# Patient Record
Sex: FEMALE | Race: WHITE | NOT HISPANIC OR LATINO | Employment: OTHER | URBAN - METROPOLITAN AREA
[De-identification: names, ages, dates, MRNs, and addresses within clinical notes are randomized per-mention and may not be internally consistent; named-entity substitution may affect disease eponyms.]

---

## 2018-05-09 ENCOUNTER — HOSPITAL ENCOUNTER (OUTPATIENT)
Dept: LAB | Facility: MEDICAL CENTER | Age: 58
End: 2018-05-09
Attending: FAMILY MEDICINE
Payer: COMMERCIAL

## 2018-05-09 LAB
25(OH)D3 SERPL-MCNC: 43 NG/ML (ref 30–100)
ALBUMIN SERPL BCP-MCNC: 4.4 G/DL (ref 3.2–4.9)
ALBUMIN/GLOB SERPL: 1.6 G/DL
ALP SERPL-CCNC: 66 U/L (ref 30–99)
ALT SERPL-CCNC: 11 U/L (ref 2–50)
ANION GAP SERPL CALC-SCNC: 12 MMOL/L (ref 0–11.9)
AST SERPL-CCNC: 18 U/L (ref 12–45)
BASOPHILS # BLD AUTO: 1 % (ref 0–1.8)
BASOPHILS # BLD: 0.07 K/UL (ref 0–0.12)
BILIRUB SERPL-MCNC: 0.7 MG/DL (ref 0.1–1.5)
BUN SERPL-MCNC: 21 MG/DL (ref 8–22)
CALCIUM SERPL-MCNC: 9.4 MG/DL (ref 8.5–10.5)
CHLORIDE SERPL-SCNC: 102 MMOL/L (ref 96–112)
CHOLEST SERPL-MCNC: 293 MG/DL (ref 100–199)
CO2 SERPL-SCNC: 26 MMOL/L (ref 20–33)
COMMENT 1642: NORMAL
CREAT SERPL-MCNC: 0.7 MG/DL (ref 0.5–1.4)
EOSINOPHIL # BLD AUTO: 0.08 K/UL (ref 0–0.51)
EOSINOPHIL NFR BLD: 1.2 % (ref 0–6.9)
ERYTHROCYTE [DISTWIDTH] IN BLOOD BY AUTOMATED COUNT: 45.7 FL (ref 35.9–50)
GLOBULIN SER CALC-MCNC: 2.8 G/DL (ref 1.9–3.5)
GLUCOSE SERPL-MCNC: 91 MG/DL (ref 65–99)
HCT VFR BLD AUTO: 46 % (ref 37–47)
HDLC SERPL-MCNC: 57 MG/DL
HGB BLD-MCNC: 15 G/DL (ref 12–16)
IMM GRANULOCYTES # BLD AUTO: 0.01 K/UL (ref 0–0.11)
IMM GRANULOCYTES NFR BLD AUTO: 0.1 % (ref 0–0.9)
LDLC SERPL CALC-MCNC: 201 MG/DL
LYMPHOCYTES # BLD AUTO: 2.52 K/UL (ref 1–4.8)
LYMPHOCYTES NFR BLD: 37.1 % (ref 22–41)
MCH RBC QN AUTO: 31.9 PG (ref 27–33)
MCHC RBC AUTO-ENTMCNC: 32.6 G/DL (ref 33.6–35)
MCV RBC AUTO: 97.9 FL (ref 81.4–97.8)
MONOCYTES # BLD AUTO: 0.45 K/UL (ref 0–0.85)
MONOCYTES NFR BLD AUTO: 6.6 % (ref 0–13.4)
MORPHOLOGY BLD-IMP: NORMAL
NEUTROPHILS # BLD AUTO: 3.67 K/UL (ref 2–7.15)
NEUTROPHILS NFR BLD: 54 % (ref 44–72)
NRBC # BLD AUTO: 0 K/UL
NRBC BLD-RTO: 0 /100 WBC
PLATELET # BLD AUTO: 287 K/UL (ref 164–446)
PMV BLD AUTO: 11.9 FL (ref 9–12.9)
POTASSIUM SERPL-SCNC: 3.9 MMOL/L (ref 3.6–5.5)
PROT SERPL-MCNC: 7.2 G/DL (ref 6–8.2)
RBC # BLD AUTO: 4.7 M/UL (ref 4.2–5.4)
SODIUM SERPL-SCNC: 140 MMOL/L (ref 135–145)
T3FREE SERPL-MCNC: 3.47 PG/ML (ref 2.4–4.2)
T4 FREE SERPL-MCNC: 0.76 NG/DL (ref 0.53–1.43)
TRIGL SERPL-MCNC: 174 MG/DL (ref 0–149)
TSH SERPL DL<=0.005 MIU/L-ACNC: 2.73 UIU/ML (ref 0.38–5.33)
WBC # BLD AUTO: 6.8 K/UL (ref 4.8–10.8)

## 2018-05-09 PROCEDURE — 82306 VITAMIN D 25 HYDROXY: CPT

## 2018-05-09 PROCEDURE — 84481 FREE ASSAY (FT-3): CPT

## 2018-05-09 PROCEDURE — 83876 ASSAY MYELOPEROXIDASE: CPT

## 2018-05-09 PROCEDURE — 83735 ASSAY OF MAGNESIUM: CPT

## 2018-05-09 PROCEDURE — 83516 IMMUNOASSAY NONANTIBODY: CPT

## 2018-05-09 PROCEDURE — 86800 THYROGLOBULIN ANTIBODY: CPT

## 2018-05-09 PROCEDURE — 84443 ASSAY THYROID STIM HORMONE: CPT

## 2018-05-09 PROCEDURE — 80053 COMPREHEN METABOLIC PANEL: CPT

## 2018-05-09 PROCEDURE — 36415 COLL VENOUS BLD VENIPUNCTURE: CPT

## 2018-05-09 PROCEDURE — 369999 HCHG MISC LAB CHARGE

## 2018-05-09 PROCEDURE — 84439 ASSAY OF FREE THYROXINE: CPT

## 2018-05-09 PROCEDURE — 83698 ASSAY LIPOPROTEIN PLA2: CPT

## 2018-05-09 PROCEDURE — 85025 COMPLETE CBC W/AUTO DIFF WBC: CPT

## 2018-05-09 PROCEDURE — 80061 LIPID PANEL: CPT

## 2018-05-11 LAB — THYROGLOB AB SERPL-ACNC: <0.9 IU/ML (ref 0–4)

## 2018-05-12 LAB — MAGNESIUM RBC-SCNC: 2.4 MMOL/L (ref 1.5–3.1)

## 2018-05-18 LAB — TEST NAME 95000: NORMAL

## 2018-05-23 LAB — TEST NAME 95000: NORMAL

## 2018-05-30 LAB — MISCELLANEOUS LAB RESULT MISCLAB: NORMAL

## 2018-06-04 ENCOUNTER — HOSPITAL ENCOUNTER (OUTPATIENT)
Dept: RADIOLOGY | Facility: MEDICAL CENTER | Age: 58
End: 2018-06-04

## 2018-06-11 ENCOUNTER — HOSPITAL ENCOUNTER (OUTPATIENT)
Dept: RADIOLOGY | Facility: MEDICAL CENTER | Age: 58
End: 2018-06-11
Attending: FAMILY MEDICINE
Payer: COMMERCIAL

## 2018-06-11 DIAGNOSIS — R78.5 FINDING OF PSYCHOTROPIC DRUG IN BLOOD: ICD-10-CM

## 2018-06-11 DIAGNOSIS — Z12.39 SCREENING BREAST EXAMINATION: ICD-10-CM

## 2018-06-11 PROCEDURE — 77067 SCR MAMMO BI INCL CAD: CPT

## 2018-06-11 PROCEDURE — 4410556 CT-CARDIAC SCORING

## 2018-12-06 ENCOUNTER — HOSPITAL ENCOUNTER (OUTPATIENT)
Dept: LAB | Facility: MEDICAL CENTER | Age: 58
End: 2018-12-06
Attending: FAMILY MEDICINE
Payer: COMMERCIAL

## 2018-12-06 LAB
25(OH)D3 SERPL-MCNC: 37 NG/ML (ref 30–100)
ALBUMIN SERPL BCP-MCNC: 4.3 G/DL (ref 3.2–4.9)
ALBUMIN/GLOB SERPL: 1.5 G/DL
ALP SERPL-CCNC: 75 U/L (ref 30–99)
ALT SERPL-CCNC: 12 U/L (ref 2–50)
ANION GAP SERPL CALC-SCNC: 7 MMOL/L (ref 0–11.9)
AST SERPL-CCNC: 18 U/L (ref 12–45)
BILIRUB SERPL-MCNC: 0.7 MG/DL (ref 0.1–1.5)
BUN SERPL-MCNC: 26 MG/DL (ref 8–22)
CALCIUM SERPL-MCNC: 9.2 MG/DL (ref 8.5–10.5)
CHLORIDE SERPL-SCNC: 104 MMOL/L (ref 96–112)
CHOLEST SERPL-MCNC: 331 MG/DL (ref 100–199)
CO2 SERPL-SCNC: 28 MMOL/L (ref 20–33)
CREAT SERPL-MCNC: 0.95 MG/DL (ref 0.5–1.4)
CRP SERPL HS-MCNC: 1.3 MG/L (ref 0–7.5)
FASTING STATUS PATIENT QL REPORTED: NORMAL
GLOBULIN SER CALC-MCNC: 2.9 G/DL (ref 1.9–3.5)
GLUCOSE SERPL-MCNC: 91 MG/DL (ref 65–99)
HDLC SERPL-MCNC: 56 MG/DL
LDLC SERPL CALC-MCNC: 237 MG/DL
POTASSIUM SERPL-SCNC: 4.3 MMOL/L (ref 3.6–5.5)
PROT SERPL-MCNC: 7.2 G/DL (ref 6–8.2)
SODIUM SERPL-SCNC: 139 MMOL/L (ref 135–145)
TRIGL SERPL-MCNC: 190 MG/DL (ref 0–149)

## 2018-12-06 PROCEDURE — 80053 COMPREHEN METABOLIC PANEL: CPT

## 2018-12-06 PROCEDURE — 80061 LIPID PANEL: CPT

## 2018-12-06 PROCEDURE — 86141 C-REACTIVE PROTEIN HS: CPT

## 2018-12-06 PROCEDURE — 82306 VITAMIN D 25 HYDROXY: CPT

## 2018-12-06 PROCEDURE — 36415 COLL VENOUS BLD VENIPUNCTURE: CPT

## 2019-03-06 ENCOUNTER — HOSPITAL ENCOUNTER (OUTPATIENT)
Dept: LAB | Facility: MEDICAL CENTER | Age: 59
End: 2019-03-06
Attending: FAMILY MEDICINE
Payer: COMMERCIAL

## 2019-03-06 LAB
ALBUMIN SERPL BCP-MCNC: 4.3 G/DL (ref 3.2–4.9)
ALBUMIN/GLOB SERPL: 1.5 G/DL
ALP SERPL-CCNC: 69 U/L (ref 30–99)
ALT SERPL-CCNC: 14 U/L (ref 2–50)
ANION GAP SERPL CALC-SCNC: 8 MMOL/L (ref 0–11.9)
AST SERPL-CCNC: 17 U/L (ref 12–45)
BASOPHILS # BLD AUTO: 0.8 % (ref 0–1.8)
BASOPHILS # BLD: 0.06 K/UL (ref 0–0.12)
BILIRUB SERPL-MCNC: 0.5 MG/DL (ref 0.1–1.5)
BUN SERPL-MCNC: 30 MG/DL (ref 8–22)
CALCIUM SERPL-MCNC: 9.1 MG/DL (ref 8.5–10.5)
CHLORIDE SERPL-SCNC: 101 MMOL/L (ref 96–112)
CHOLEST SERPL-MCNC: 308 MG/DL (ref 100–199)
CO2 SERPL-SCNC: 29 MMOL/L (ref 20–33)
CREAT SERPL-MCNC: 0.79 MG/DL (ref 0.5–1.4)
EOSINOPHIL # BLD AUTO: 0.1 K/UL (ref 0–0.51)
EOSINOPHIL NFR BLD: 1.4 % (ref 0–6.9)
ERYTHROCYTE [DISTWIDTH] IN BLOOD BY AUTOMATED COUNT: 45.2 FL (ref 35.9–50)
EST. AVERAGE GLUCOSE BLD GHB EST-MCNC: 120 MG/DL
FASTING STATUS PATIENT QL REPORTED: NORMAL
GLOBULIN SER CALC-MCNC: 2.8 G/DL (ref 1.9–3.5)
GLUCOSE SERPL-MCNC: 89 MG/DL (ref 65–99)
HBA1C MFR BLD: 5.8 % (ref 0–5.6)
HCT VFR BLD AUTO: 45.1 % (ref 37–47)
HDLC SERPL-MCNC: 52 MG/DL
HGB BLD-MCNC: 14.3 G/DL (ref 12–16)
IMM GRANULOCYTES # BLD AUTO: 0.04 K/UL (ref 0–0.11)
IMM GRANULOCYTES NFR BLD AUTO: 0.5 % (ref 0–0.9)
LDLC SERPL CALC-MCNC: ABNORMAL MG/DL
LYMPHOCYTES # BLD AUTO: 3.22 K/UL (ref 1–4.8)
LYMPHOCYTES NFR BLD: 43.7 % (ref 22–41)
MCH RBC QN AUTO: 31.6 PG (ref 27–33)
MCHC RBC AUTO-ENTMCNC: 31.7 G/DL (ref 33.6–35)
MCV RBC AUTO: 99.8 FL (ref 81.4–97.8)
MONOCYTES # BLD AUTO: 0.51 K/UL (ref 0–0.85)
MONOCYTES NFR BLD AUTO: 6.9 % (ref 0–13.4)
NEUTROPHILS # BLD AUTO: 3.44 K/UL (ref 2–7.15)
NEUTROPHILS NFR BLD: 46.7 % (ref 44–72)
NRBC # BLD AUTO: 0 K/UL
NRBC BLD-RTO: 0 /100 WBC
PLATELET # BLD AUTO: 303 K/UL (ref 164–446)
PMV BLD AUTO: 11.2 FL (ref 9–12.9)
POTASSIUM SERPL-SCNC: 4 MMOL/L (ref 3.6–5.5)
PROT SERPL-MCNC: 7.1 G/DL (ref 6–8.2)
RBC # BLD AUTO: 4.52 M/UL (ref 4.2–5.4)
SODIUM SERPL-SCNC: 138 MMOL/L (ref 135–145)
T3FREE SERPL-MCNC: 3.63 PG/ML (ref 2.4–4.2)
T4 FREE SERPL-MCNC: 0.89 NG/DL (ref 0.53–1.43)
TRIGL SERPL-MCNC: 406 MG/DL (ref 0–149)
TSH SERPL DL<=0.005 MIU/L-ACNC: 2.6 UIU/ML (ref 0.38–5.33)
WBC # BLD AUTO: 7.4 K/UL (ref 4.8–10.8)

## 2019-03-06 PROCEDURE — 80053 COMPREHEN METABOLIC PANEL: CPT

## 2019-03-06 PROCEDURE — 84439 ASSAY OF FREE THYROXINE: CPT

## 2019-03-06 PROCEDURE — 84443 ASSAY THYROID STIM HORMONE: CPT

## 2019-03-06 PROCEDURE — 80061 LIPID PANEL: CPT

## 2019-03-06 PROCEDURE — 36415 COLL VENOUS BLD VENIPUNCTURE: CPT

## 2019-03-06 PROCEDURE — 83036 HEMOGLOBIN GLYCOSYLATED A1C: CPT

## 2019-03-06 PROCEDURE — 85025 COMPLETE CBC W/AUTO DIFF WBC: CPT

## 2019-03-06 PROCEDURE — 84481 FREE ASSAY (FT-3): CPT

## 2019-03-06 PROCEDURE — 86800 THYROGLOBULIN ANTIBODY: CPT

## 2019-03-08 LAB — THYROGLOB AB SERPL-ACNC: <0.9 IU/ML (ref 0–4)

## 2019-07-23 ENCOUNTER — OFFICE VISIT (OUTPATIENT)
Dept: CARDIOLOGY | Facility: MEDICAL CENTER | Age: 59
End: 2019-07-23
Payer: COMMERCIAL

## 2019-07-23 VITALS
DIASTOLIC BLOOD PRESSURE: 80 MMHG | OXYGEN SATURATION: 96 % | WEIGHT: 158 LBS | SYSTOLIC BLOOD PRESSURE: 120 MMHG | BODY MASS INDEX: 24.8 KG/M2 | HEART RATE: 88 BPM | HEIGHT: 67 IN

## 2019-07-23 DIAGNOSIS — E78.49 FAMILIAL HYPERLIPIDEMIA, HIGH LDL: ICD-10-CM

## 2019-07-23 PROCEDURE — 99204 OFFICE O/P NEW MOD 45 MIN: CPT | Performed by: INTERNAL MEDICINE

## 2019-07-23 RX ORDER — ZOLPIDEM TARTRATE 12.5 MG/1
TABLET, FILM COATED, EXTENDED RELEASE ORAL
Refills: 0 | COMMUNITY
Start: 2019-07-03

## 2019-07-23 RX ORDER — ROSUVASTATIN CALCIUM 5 MG/1
5 TABLET, COATED ORAL EVERY EVENING
Qty: 45 TAB | Refills: 0 | Status: SHIPPED | OUTPATIENT
Start: 2019-07-23 | End: 2019-09-06 | Stop reason: SDUPTHER

## 2019-07-23 NOTE — PATIENT INSTRUCTIONS
-PCSK9-Inhibitors, injected in the thigh every 2 weeks, drops the LDL well below 100, not sure of the vascular benefit, ie, not clear how much is protects from heart attack and stroke.  -Statin therapy, they are all different.  There is a benefit to being on any dose, even if one time per week at the lowest dose. I would use Crestor 5 mg once time per week.  -Goal LDL is under 100.  -Call if you have any side effects, questions or concerns: [phone]

## 2019-07-23 NOTE — PROGRESS NOTES
"Subjective:   Chief Complaint:   Chief Complaint   Patient presents with   • Hyperlipidemia       Mari \"Aranza\" Torie Sahni is a 58 y.o. female who is referred by Dr. Ennis for HLP.  Has had this since her 20s. Runs in the family.    Lowest TC she ever had was 200.    Has had myalgias, lipitor and a second one.    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    She is not limited by chest pain, pressure or tightness.   No significant dyspnea on exertion, orthopnea or lower extremity swelling.   No significant palpitations, dizziness, or presyncope/syncope.   No symptoms of leg claudication.   No stroke/TIA like symptoms.    No DM, tobacco, HTN, CKD, SLE/RA.    Mother had angioplasty in her 40s, no MI. Has HLP, DM, HTN.  Father has HLP, not sure how high.      DATA REVIEWED by me:    CT score 18  Coronary calcification:  LMA - 0.0  LCX - 0.0  LAD - 0.0  RCA - 0.0  PDA - 0.0    Calcium score:  0.0      Most recent labs:     3-6-19 hgb 14.3, plt 303, na 138, k 4, cr 0.79, LFTs normal, hgb A1C 5.8, , , HDL 52, Cannot detect LDL, TSH 2.6    18 , , HDL 56,     History reviewed. No pertinent past medical history.  Past Surgical History:   Procedure Laterality Date   • OTHER ORTHOPEDIC SURGERY      back pain x 6 months   • PB ENLARGE BREAST WITH IMPLANT       Family History   Problem Relation Age of Onset   • Heart Disease Mother         Angioplasty 40s   • Hyperlipidemia Mother    • Diabetes Mother    • Hypertension Mother    • Other Father         Alive at 82, has Parkinsons   • Hyperlipidemia Father    • Hypertension Sister    • Hyperlipidemia Sister    • Heart Attack Paternal Grandfather          of MI in 60s, CABG, had PM     Social History     Social History   • Marital status:      Spouse name: N/A   • Number of children: N/A   • Years of education: N/A     Occupational History   • Not on file.     Social History Main Topics   • Smoking status: Never " "Smoker   • Smokeless tobacco: Never Used   • Alcohol use No   • Drug use: Unknown   • Sexual activity: Not on file     Other Topics Concern   • Not on file     Social History Narrative   • No narrative on file     Allergies   Allergen Reactions   • Codeine    • Pcn [Penicillins]        Current Outpatient Prescriptions   Medication Sig Dispense Refill   • zolpidem (AMBIEN CR) 12.5 MG CR tablet TAKE 1 TABLET ORALLY 1 HOUR BEFORE BEDTIME AS NEEDED FOR INSOMNIA 7-3-19 G47.01  0   • rosuvastatin (CRESTOR) 5 MG Tab Take 1 Tab by mouth every evening. 45 Tab 0   • DULoxetine HCl (CYMBALTA PO) Take 120 mg by mouth every day.       No current facility-administered medications for this visit.        ROS  All others systems reviewed and negative.     Objective:     /80 (BP Location: Left arm, Patient Position: Sitting, BP Cuff Size: Adult)   Pulse 88   Ht 1.702 m (5' 7\")   Wt 71.7 kg (158 lb)   SpO2 96%  Body mass index is 24.75 kg/m².    Physical Exam   General: No acute distress. Well nourished.  HEENT: EOM grossly intact, no scleral icterus, no pharyngeal erythema.   Neck:  No JVD, no bruits, trachea midline  CVS: RRR. Normal S1, S2. No M/R/G. No LE edema.  2+ radial pulses, 2+ DP pulses  Resp: CTAB. No wheezing or crackles/rhonchi. Normal respiratory effort.  Abdomen: Soft, NT, no lisa hepatomegaly.  MSK/Ext: No clubbing or cyanosis.  Skin: Warm and dry, no rashes.  Neurological: CN III-XII grossly intact. No focal deficits.   Psych: A&O x 3, appropriate affect, good judgement      Assessment:     1. Familial hyperlipidemia, high LDL  REFERRAL TO LIPID CLINIC       Medical Decision Making:  Today's Assessment / Status / Plan:     -LDL is greater than 190, familial HLP, statin or PCSK9-I  -She will try low dose crestor 5 mg one time weekly  -Referral to lipid clinic for PCSK9-I therapy, education and navigating cost.  -It is good that she has no calcium but she could still have soft/dark plaque CAD.  Also statin " therapy can cause calcification/worsening calcium score.    Written instructions given today:    -PCSK9-Inhibitors, injected in the thigh every 2 weeks, drops the LDL well below 100, not sure of the vascular benefit, ie, not clear how much is protects from heart attack and stroke.  -Statin therapy, they are all different.  There is a benefit to being on any dose, even if one time per week at the lowest dose. I would use Crestor 5 mg once time per week.  -Goal LDL is under 100.  -Call if you have any side effects, questions or concerns: [phone]        Return in about 1 year (around 7/23/2020).    It is my pleasure to participate in the care of Ms. Sahni.  Please do not hesitate to contact me with questions or concerns.    Mari Musa MD, [facility]  Cardiologist [facility]    Please note that this dictation was created using voice recognition software. I have made every reasonable attempt to correct obvious errors, but it is possible there are errors of grammar and possibly content that I did not discover before finalizing the note.

## 2019-07-23 NOTE — LETTER
"     [facility]   [address]   [address]  [place] [address]  Phone: [phone]  Fax: [phone]              Mari Sahni  1960    Encounter Date: 7/23/2019    Mari Musa M.D.          PROGRESS NOTE:  Subjective:   Chief Complaint:   Chief Complaint   Patient presents with   • Hyperlipidemia       Mari \"Aranza\" Torie Sahni is a 58 y.o. female who is referred by Dr. Ennis for HLP.  Has had this since her 20s. Runs in the family.    Lowest TC she ever had was 200.    Has had myalgias, lipitor and a second one.    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    She is not limited by chest pain, pressure or tightness.   No significant dyspnea on exertion, orthopnea or lower extremity swelling.   No significant palpitations, dizziness, or presyncope/syncope.   No symptoms of leg claudication.   No stroke/TIA like symptoms.    No DM, tobacco, HTN, CKD, SLE/RA.    Mother had angioplasty in her 40s, no MI. Has HLP, DM, HTN.  Father has HLP, not sure how high.      DATA REVIEWED by me:    CT score 6-11-18  Coronary calcification:  LMA - 0.0  LCX - 0.0  LAD - 0.0  RCA - 0.0  PDA - 0.0    Calcium score:  0.0      Most recent labs:     3-6-19 hgb 14.3, plt 303, na 138, k 4, cr 0.79, LFTs normal, hgb A1C 5.8, , , HDL 52, Cannot detect LDL, TSH 2.6    12-6-18 , , HDL 56,     History reviewed. No pertinent past medical history.  Past Surgical History:   Procedure Laterality Date   • OTHER ORTHOPEDIC SURGERY      back pain x 6 months   • PB ENLARGE BREAST WITH IMPLANT       Family History   Problem Relation Age of Onset   • Heart Disease Mother         Angioplasty 40s   • Hyperlipidemia Mother    • Diabetes Mother    • Hypertension Mother    • Other Father         Alive at 82, has Parkinsons   • Hyperlipidemia Father    • Hypertension Sister    • Hyperlipidemia Sister    • Heart Attack Paternal " "Grandfather          of MI in 60s, CABG, had PM     Social History     Social History   • Marital status:      Spouse name: N/A   • Number of children: N/A   • Years of education: N/A     Occupational History   • Not on file.     Social History Main Topics   • Smoking status: Never Smoker   • Smokeless tobacco: Never Used   • Alcohol use No   • Drug use: Unknown   • Sexual activity: Not on file     Other Topics Concern   • Not on file     Social History Narrative   • No narrative on file     Allergies   Allergen Reactions   • Codeine    • Pcn [Penicillins]        Current Outpatient Prescriptions   Medication Sig Dispense Refill   • zolpidem (AMBIEN CR) 12.5 MG CR tablet TAKE 1 TABLET ORALLY 1 HOUR BEFORE BEDTIME AS NEEDED FOR INSOMNIA 7-3-19 G47.01  0   • rosuvastatin (CRESTOR) 5 MG Tab Take 1 Tab by mouth every evening. 45 Tab 0   • DULoxetine HCl (CYMBALTA PO) Take 120 mg by mouth every day.       No current facility-administered medications for this visit.        ROS  All others systems reviewed and negative.     Objective:     /80 (BP Location: Left arm, Patient Position: Sitting, BP Cuff Size: Adult)   Pulse 88   Ht 1.702 m (5' 7\")   Wt 71.7 kg (158 lb)   SpO2 96%  Body mass index is 24.75 kg/m².    Physical Exam   General: No acute distress. Well nourished.  HEENT: EOM grossly intact, no scleral icterus, no pharyngeal erythema.   Neck:  No JVD, no bruits, trachea midline  CVS: RRR. Normal S1, S2. No M/R/G. No LE edema.  2+ radial pulses, 2+ DP pulses  Resp: CTAB. No wheezing or crackles/rhonchi. Normal respiratory effort.  Abdomen: Soft, NT, no lisa hepatomegaly.  MSK/Ext: No clubbing or cyanosis.  Skin: Warm and dry, no rashes.  Neurological: CN III-XII grossly intact. No focal deficits.   Psych: A&O x 3, appropriate affect, good judgement      Assessment:     1. Familial hyperlipidemia, high LDL  REFERRAL TO LIPID CLINIC       Medical Decision Making:  Today's Assessment / Status / " Plan:     -LDL is greater than 190, familial HLP, statin or PCSK9-I  -She will try low dose crestor 5 mg one time weekly  -Referral to lipid clinic for PCSK9-I therapy, education and navigating cost.  -It is good that she has no calcium but she could still have soft/dark plaque CAD.  Also statin therapy can cause calcification/worsening calcium score.    Written instructions given today:    -PCSK9-Inhibitors, injected in the thigh every 2 weeks, drops the LDL well below 100, not sure of the vascular benefit, ie, not clear how much is protects from heart attack and stroke.  -Statin therapy, they are all different.  There is a benefit to being on any dose, even if one time per week at the lowest dose. I would use Crestor 5 mg once time per week.  -Goal LDL is under 100.  -Call if you have any side effects, questions or concerns: [phone]        Return in about 1 year (around 7/23/2020).    It is my pleasure to participate in the care of Ms. Sahni.  Please do not hesitate to contact me with questions or concerns.    Mari Musa MD, [facility]  Cardiologist [facility]    Please note that this dictation was created using voice recognition software. I have made every reasonable attempt to correct obvious errors, but it is possible there are errors of grammar and possibly content that I did not discover before finalizing the note.      John Goetz M.D.  [address] [address]  VIA Facsimile: [phone]

## 2019-08-21 ENCOUNTER — NON-PROVIDER VISIT (OUTPATIENT)
Dept: VASCULAR LAB | Facility: MEDICAL CENTER | Age: 59
End: 2019-08-21
Attending: INTERNAL MEDICINE
Payer: COMMERCIAL

## 2019-08-21 VITALS — SYSTOLIC BLOOD PRESSURE: 136 MMHG | HEART RATE: 66 BPM | DIASTOLIC BLOOD PRESSURE: 87 MMHG

## 2019-08-21 DIAGNOSIS — E78.01 FAMILIAL HYPERCHOLESTEROLEMIA: ICD-10-CM

## 2019-08-21 PROCEDURE — 99213 OFFICE O/P EST LOW 20 MIN: CPT

## 2019-08-21 RX ORDER — ERGOCALCIFEROL 1.25 MG/1
50000 CAPSULE ORAL
Qty: 6 CAP | Refills: 0 | Status: SHIPPED | OUTPATIENT
Start: 2019-08-21 | End: 2019-09-30 | Stop reason: SDUPTHER

## 2019-08-21 NOTE — Clinical Note
Dr Bloch-No arcus or xanthomas visualized or palpated.  CAC of 0, curious your thoughts on interpretation.  Regardless I'm hopeful we will be able to maximize her rosuvastatin to 5 mg Daily.  Right now we are getting rosuvastatin twice weekly.  I'm certain this will get great buy in from the pt with the resulting preliminary NMR and pt will titrate up with the addition of VitD. Thanks,Celestine

## 2019-08-21 NOTE — PATIENT INSTRUCTIONS
Week 1-Vit D 50,000 units on MWF; Crestor on Sundays  Week 2- Vit D 50,000 units on MWF; Crestor on Sundays  Week 3-6 - Vit D 4000 units daily; Crestor on Sundays and Wednesdays    Exercise (finish the rings).   Continue with a great diet, focused on increasing vegetables.

## 2019-08-21 NOTE — NON-PROVIDER
[facility] - Initial Visit  Date of Service: 08/21/19    Mari Sahni has been referred for evaluation and management of dyslipidemia.     Referral Source: Dr Mari Musa    HPI  History of ASCVD: No  Other Established (non-atherosclerotic) Vascular Disease, if Present: None  Age at Initial Diagnosis of Dyslipidemia: Pt reports she has had high lipids since her 20's    Current Prescription Lipid Lowering Medications - including dose:   Statin: Crestor 5 mg once WEEKLY  Non-Statin: None  Current Lipid Lowering and Related Supplements:   None  Any Current Side Effects Potentially Related to Lipid Lowering therapy?   No  Current Adherence to Lipid Lowering Therapies   Complete  Previously Attempted Interventions for Lipids - including outcome  Statin: Crestor (unknown dose, likely >5 mg)    Atorvastatin (Unknown dose)   Outcome: Severe muscle pain in lower extremities.   Non-Statin: None   Outcome: N/A  Any Previous History of Statin Intolerance?   See above  Baseline Lipids Prior to Treatment:      12/6/2018 07:49   Cholesterol,Tot 331 (H)   Triglycerides 190 (H)   HDL 56    (H)     Other Pertinent History: none  History of other CV risk factors: none    PAST MEDICAL HISTORY:  has no past medical history of Breast cancer (HCC), Clotting disorder (HCC), or Diabetes.    PAST SURGICAL HISTORY:  has a past surgical history that includes other orthopedic surgery and pr enlarge breast with implant.    CURRENT MEDICATIONS:   Current Outpatient Medications:   •  zolpidem, TAKE 1 TABLET ORALLY 1 HOUR BEFORE BEDTIME AS NEEDED FOR INSOMNIA 7-3-19 G47.01, Taking  •  rosuvastatin, 5 mg, Oral, Q EVENING  •  DULoxetine HCl (CYMBALTA PO), 120 mg, Oral, DAILY, Taking    ALLERGIES: Codeine and Pcn [penicillins]    FAMILY HISTORY:    Mother had angioplasty in her 40's; no known subsequent CV events thereafter.      Sister: Also has hyperlipidemia, likely LDL>190.     SOCIAL HISTORY   Social History     Tobacco Use    Smoking Status Never Smoker   Smokeless Tobacco Never Used     Did smoke 20 years ago, but no longer uses cigarettes  Change in weight: Recent 20 lb weight loss (due to stress)  Exercise habits: no regular exercise program  Diet: Reduced CHO, will focus on increasing vegetable intake.     ROS  Physical Exam    DATA REVIEW:  Most Recent Lipid Panel:   Lab Results   Component Value Date    CHOLSTRLTOT 308 (H) 03/06/2019    TRIGLYCERIDE 406 (H) 03/06/2019    HDL 52 03/06/2019    LDL see below 03/06/2019       Other Pertinent Blood Work:   Lab Results   Component Value Date    SODIUM 138 03/06/2019    POTASSIUM 4.0 03/06/2019    CHLORIDE 101 03/06/2019    CO2 29 03/06/2019    ANION 8.0 03/06/2019    GLUCOSE 89 03/06/2019    BUN 30 (H) 03/06/2019    CREATININE 0.79 03/06/2019    CALCIUM 9.1 03/06/2019    ASTSGOT 17 03/06/2019    ALTSGPT 14 03/06/2019    ALKPHOSPHAT 69 03/06/2019    TBILIRUBIN 0.5 03/06/2019    ALBUMIN 4.3 03/06/2019    AGRATIO 1.5 03/06/2019    TSHULTRASEN 2.600 03/06/2019       Other: NA    Recent Imaging Studies:    CAC 6/11/18    Coronary calcification:  LMA - 0.0  LCX - 0.0  LAD - 0.0  RCA - 0.0  PDA - 0.0    Calcium score:  0.0    ASSESSMENT AND PLAN  Patient Type, check all that apply:   Primary Prevention  Established Atherosclerotic Cardiovascular Disease (ASCVD)  No  Other Established (non-atherosclerotic) Vascular Disease, if Present:  None  Evidence of Heterozygous Familial Hypercholesterolemia (FH):   Very likely  ACC/AHA Indication for Statin Therapy, dorothea all that apply:  LDL-C at baseline >190 mg/dl: Indication for High intensity statin   Calculated Risk for ASCVD, if applicable    N/A  Other Significant Risk Markers, if any, dorothea all that apply   none  Goal LDL-C and nonHDL-C based on Clinic Protocol  LDL-C:   <100 mg/dL   Non-HDL: <130 mg/dL  Lifestyle Recommendations From Today’s Visit:    Diet: begin increased vegetable consumption  Exercise: Begin 30 min aerobic exercise three times  weekly.  Statin Therapy Recommendations from Today’s Visit:   Increase rosuvastatin once weekly to twice weekly.   Non-Statin Medications Recommendations from Today’s Visit:   Begin VitD supplementation  Indication for PCSK9 Inhibitor, if applicable:  Not currently indicated, will await to see if we can titrate rosuvastatin to maximally tolerated dose prior to PCSK9.  Supplements Recommended at this visit: None   Recommendations for Other Cardiovascular Risk Factors, dorothea all that apply: Diabetes/Impaired Fasting Glucose- Decrease CHO, continue to monitor.    BP elevated today, continue to monitor.    Pt has been able to tolerate rosuvastatin 5 mg once weekly.  Her past doses of rosuvastatin are likely to be >5 mg daily.  Therefore will load pt with VitD then begin daily VitD replacement while rosuvastatin is increased, see below:    Week 1-Vit D 50,000 units on MWF; Crestor on Sundays  Week 2- Vit D 50,000 units on MWF; Crestor on Sundays  Week 3-6 - Vit D 4000 units daily; Crestor on Sundays and Wednesdays    With a CAC of 0, it makes it more difficult to determine if addition of PCSK9 is necessary in the future.  Regardless, pt would benefit from maximizing statin.  We will likely be able to get pt on rosuvastatin 5 mg MWF, optimally I would like to see 5 mg Daily as our goal dose.  I would avoid doses higher than this given her hx of intolerance.      Based on maximally tolerated dose, if pt is within 20% of goal LDL and non-HDL, will assess if Zetia might be a viable option.      Studies Ordered at Todays Visit: None  Blood Work Ordered At Today’s visit: NMR, Direct LDL, CMP   Follow-Up: 6 weeks    Celestine Dooley, PharmD    CC:    Sullivan, Laura E J, M.D. Michael Bloch, M.D.

## 2019-09-06 RX ORDER — ROSUVASTATIN CALCIUM 5 MG/1
5 TABLET, COATED ORAL EVERY EVENING
Qty: 90 TAB | Refills: 1 | Status: SHIPPED | OUTPATIENT
Start: 2019-09-06 | End: 2019-10-11 | Stop reason: SINTOL

## 2019-09-17 ENCOUNTER — TELEPHONE (OUTPATIENT)
Dept: VASCULAR LAB | Facility: MEDICAL CENTER | Age: 59
End: 2019-09-17

## 2019-09-17 NOTE — TELEPHONE ENCOUNTER
[facility]    Pt called stating she has been feeling ill the past week.  Symtoms include fatigue, body ache, and sinus pressure.  Pt was concerned the fatigue and body ache were secondary to statin use.  Given her symptoms are more similar to a viral infection, suggested she reduce rosuvastatin Sunday and Wednesday dose to just Sunday only.      If pt isn't feeling better by next week, requested she call the clinic to discuss relationship of symptoms to statin use.      Follow up appt in 2 weeks.    Celestine Dooley, MarianaD

## 2019-09-24 ENCOUNTER — HOSPITAL ENCOUNTER (OUTPATIENT)
Dept: LAB | Facility: MEDICAL CENTER | Age: 59
End: 2019-09-24
Attending: NURSE PRACTITIONER
Payer: COMMERCIAL

## 2019-09-24 DIAGNOSIS — E78.01 FAMILIAL HYPERCHOLESTEROLEMIA: ICD-10-CM

## 2019-09-24 LAB
ALBUMIN SERPL BCP-MCNC: 4.7 G/DL (ref 3.2–4.9)
ALBUMIN/GLOB SERPL: 2.1 G/DL
ALP SERPL-CCNC: 81 U/L (ref 30–99)
ALT SERPL-CCNC: 14 U/L (ref 2–50)
ANION GAP SERPL CALC-SCNC: 8 MMOL/L (ref 0–11.9)
AST SERPL-CCNC: 18 U/L (ref 12–45)
BILIRUB SERPL-MCNC: 0.6 MG/DL (ref 0.1–1.5)
BUN SERPL-MCNC: 30 MG/DL (ref 8–22)
CALCIUM SERPL-MCNC: 9.6 MG/DL (ref 8.5–10.5)
CHLORIDE SERPL-SCNC: 105 MMOL/L (ref 96–112)
CO2 SERPL-SCNC: 29 MMOL/L (ref 20–33)
CREAT SERPL-MCNC: 0.87 MG/DL (ref 0.5–1.4)
GLOBULIN SER CALC-MCNC: 2.2 G/DL (ref 1.9–3.5)
GLUCOSE SERPL-MCNC: 86 MG/DL (ref 65–99)
POTASSIUM SERPL-SCNC: 3.9 MMOL/L (ref 3.6–5.5)
PROT SERPL-MCNC: 6.9 G/DL (ref 6–8.2)
SODIUM SERPL-SCNC: 142 MMOL/L (ref 135–145)

## 2019-09-24 PROCEDURE — 80061 LIPID PANEL: CPT

## 2019-09-24 PROCEDURE — 36415 COLL VENOUS BLD VENIPUNCTURE: CPT

## 2019-09-24 PROCEDURE — 83704 LIPOPROTEIN BLD QUAN PART: CPT

## 2019-09-24 PROCEDURE — 83721 ASSAY OF BLOOD LIPOPROTEIN: CPT

## 2019-09-24 PROCEDURE — 80053 COMPREHEN METABOLIC PANEL: CPT

## 2019-09-26 LAB — LDLC SERPL-MCNC: 171 MG/DL (ref 0–129)

## 2019-09-30 RX ORDER — ERGOCALCIFEROL 1.25 MG/1
CAPSULE ORAL
Qty: 6 CAP | Refills: 2 | Status: SHIPPED | OUTPATIENT
Start: 2019-09-30 | End: 2022-10-31

## 2019-10-01 LAB
CHOLEST SERPL-MCNC: 246 MG/DL
FASTING STATUS PATIENT QL REPORTED: NORMAL
HDL PARTICAL NO Q4363: 37.5 UMOL/L
HDL SIZE Q4361: 8.3 NM
HDLC SERPL-MCNC: 58 MG/DL (ref 40–59)
HLD.LARGE SERPL-SCNC: <2.8 UMOL/L
L VLDL PART NO Q4357: 3.4 NMOL/L
LDL SERPL QN: 20.8 NM
LDL SERPL-SCNC: 1849 NMOL/L
LDL SMALL SERPL-SCNC: 771 NMOL/L
LDLC SERPL CALC-MCNC: 153 MG/DL
PATHOLOGY STUDY: ABNORMAL
TRIGL SERPL-MCNC: 176 MG/DL (ref 30–149)
VLDL SIZE Q4362: 47 NM

## 2019-10-02 ENCOUNTER — NON-PROVIDER VISIT (OUTPATIENT)
Dept: VASCULAR LAB | Facility: MEDICAL CENTER | Age: 59
End: 2019-10-02
Attending: INTERNAL MEDICINE
Payer: COMMERCIAL

## 2019-10-02 DIAGNOSIS — E78.01 FAMILIAL HYPERCHOLESTEROLEMIA: ICD-10-CM

## 2019-10-02 PROCEDURE — 99212 OFFICE O/P EST SF 10 MIN: CPT

## 2019-10-11 ENCOUNTER — TELEPHONE (OUTPATIENT)
Dept: VASCULAR LAB | Facility: MEDICAL CENTER | Age: 59
End: 2019-10-11

## 2019-10-11 RX ORDER — ATORVASTATIN CALCIUM 10 MG/1
TABLET, FILM COATED ORAL
Qty: 30 TAB | Refills: 0 | Status: SHIPPED | OUTPATIENT
Start: 2019-10-11 | End: 2019-11-03 | Stop reason: SDUPTHER

## 2019-10-11 NOTE — TELEPHONE ENCOUNTER
[facility]    Patient called the clinic today to report that just after a little over a week of being on Crestor 5mg twice weekly (Sun and Thurs) she is feeling the same MEHRDAD as before, such as flu like symptoms of body aches and muscle soreness. Requested that the patient come into clinic sooner than scheduled to reevaluate her symptoms and discuss further options.   Patient asked if she could instead give a trial to Lipitor 10mg twice weekly, and keep her same follow up appt.   I think this is resonable as she had been on Lipitor a few years ago and she remembers tolerating the medication. Rx for Lipitor (Atorvastatin) 10mg sent to pharmacy of choice and patient instructed to begin taking next week and keep same follow up appt with labs.     Margarette Mae  PharmD

## 2019-10-30 ENCOUNTER — APPOINTMENT (OUTPATIENT)
Dept: VASCULAR LAB | Facility: MEDICAL CENTER | Age: 59
End: 2019-10-30
Attending: INTERNAL MEDICINE
Payer: COMMERCIAL

## 2019-11-04 RX ORDER — ATORVASTATIN CALCIUM 10 MG/1
TABLET, FILM COATED ORAL
Qty: 30 TAB | Refills: 1 | Status: SHIPPED | OUTPATIENT
Start: 2019-11-04 | End: 2022-10-31

## 2020-01-09 ENCOUNTER — HOSPITAL ENCOUNTER (OUTPATIENT)
Dept: LAB | Facility: MEDICAL CENTER | Age: 60
End: 2020-01-09
Attending: FAMILY MEDICINE
Payer: COMMERCIAL

## 2020-01-09 LAB
BASOPHILS # BLD AUTO: 1 % (ref 0–1.8)
BASOPHILS # BLD: 0.07 K/UL (ref 0–0.12)
CHOLEST SERPL-MCNC: 275 MG/DL (ref 100–199)
EOSINOPHIL # BLD AUTO: 0.11 K/UL (ref 0–0.51)
EOSINOPHIL NFR BLD: 1.6 % (ref 0–6.9)
ERYTHROCYTE [DISTWIDTH] IN BLOOD BY AUTOMATED COUNT: 46.3 FL (ref 35.9–50)
HCT VFR BLD AUTO: 44.2 % (ref 37–47)
HDLC SERPL-MCNC: 61 MG/DL
HGB BLD-MCNC: 14.2 G/DL (ref 12–16)
IMM GRANULOCYTES # BLD AUTO: 0.03 K/UL (ref 0–0.11)
IMM GRANULOCYTES NFR BLD AUTO: 0.4 % (ref 0–0.9)
LDLC SERPL CALC-MCNC: 175 MG/DL
LYMPHOCYTES # BLD AUTO: 2.71 K/UL (ref 1–4.8)
LYMPHOCYTES NFR BLD: 40.1 % (ref 22–41)
MCH RBC QN AUTO: 32.1 PG (ref 27–33)
MCHC RBC AUTO-ENTMCNC: 32.1 G/DL (ref 33.6–35)
MCV RBC AUTO: 100 FL (ref 81.4–97.8)
MONOCYTES # BLD AUTO: 0.59 K/UL (ref 0–0.85)
MONOCYTES NFR BLD AUTO: 8.7 % (ref 0–13.4)
NEUTROPHILS # BLD AUTO: 3.25 K/UL (ref 2–7.15)
NEUTROPHILS NFR BLD: 48.2 % (ref 44–72)
NRBC # BLD AUTO: 0 K/UL
NRBC BLD-RTO: 0 /100 WBC
PLATELET # BLD AUTO: 307 K/UL (ref 164–446)
PMV BLD AUTO: 11 FL (ref 9–12.9)
RBC # BLD AUTO: 4.42 M/UL (ref 4.2–5.4)
TRIGL SERPL-MCNC: 193 MG/DL (ref 0–149)
WBC # BLD AUTO: 6.8 K/UL (ref 4.8–10.8)

## 2020-01-09 PROCEDURE — 84481 FREE ASSAY (FT-3): CPT

## 2020-01-09 PROCEDURE — 84439 ASSAY OF FREE THYROXINE: CPT

## 2020-01-09 PROCEDURE — 80061 LIPID PANEL: CPT

## 2020-01-09 PROCEDURE — 84443 ASSAY THYROID STIM HORMONE: CPT

## 2020-01-09 PROCEDURE — 36415 COLL VENOUS BLD VENIPUNCTURE: CPT

## 2020-01-09 PROCEDURE — 82306 VITAMIN D 25 HYDROXY: CPT

## 2020-01-09 PROCEDURE — 85025 COMPLETE CBC W/AUTO DIFF WBC: CPT

## 2020-01-09 PROCEDURE — 86800 THYROGLOBULIN ANTIBODY: CPT

## 2020-01-10 LAB
25(OH)D3 SERPL-MCNC: 74 NG/ML (ref 30–100)
T3FREE SERPL-MCNC: 3.41 PG/ML (ref 2.4–4.2)
T4 FREE SERPL-MCNC: 0.88 NG/DL (ref 0.53–1.43)
TSH SERPL DL<=0.005 MIU/L-ACNC: 3.54 UIU/ML (ref 0.38–5.33)

## 2020-01-11 LAB — THYROGLOB AB SERPL-ACNC: <0.9 IU/ML (ref 0–4)

## 2020-02-27 ENCOUNTER — HOSPITAL ENCOUNTER (OUTPATIENT)
Dept: RADIOLOGY | Facility: MEDICAL CENTER | Age: 60
End: 2020-02-27
Attending: FAMILY MEDICINE
Payer: COMMERCIAL

## 2020-02-27 DIAGNOSIS — E78.5 HYPERLIPIDEMIA, UNSPECIFIED HYPERLIPIDEMIA TYPE: ICD-10-CM

## 2020-02-27 DIAGNOSIS — Z12.31 VISIT FOR SCREENING MAMMOGRAM: ICD-10-CM

## 2020-02-27 DIAGNOSIS — Z82.49 FAMILY HISTORY OF ISCHEMIC HEART DISEASE: ICD-10-CM

## 2020-02-27 PROCEDURE — 77067 SCR MAMMO BI INCL CAD: CPT

## 2020-02-27 PROCEDURE — 93880 EXTRACRANIAL BILAT STUDY: CPT

## 2020-06-01 ENCOUNTER — HOSPITAL ENCOUNTER (OUTPATIENT)
Dept: LAB | Facility: MEDICAL CENTER | Age: 60
End: 2020-06-01
Attending: FAMILY MEDICINE
Payer: COMMERCIAL

## 2020-06-01 LAB
25(OH)D3 SERPL-MCNC: 84 NG/ML (ref 30–100)
ALBUMIN SERPL BCP-MCNC: 4.3 G/DL (ref 3.2–4.9)
ALBUMIN/GLOB SERPL: 1.7 G/DL
ALP SERPL-CCNC: 81 U/L (ref 30–99)
ALT SERPL-CCNC: 13 U/L (ref 2–50)
ANION GAP SERPL CALC-SCNC: 12 MMOL/L (ref 7–16)
AST SERPL-CCNC: 27 U/L (ref 12–45)
BASOPHILS # BLD AUTO: 0.8 % (ref 0–1.8)
BASOPHILS # BLD: 0.05 K/UL (ref 0–0.12)
BILIRUB SERPL-MCNC: 0.3 MG/DL (ref 0.1–1.5)
BUN SERPL-MCNC: 27 MG/DL (ref 8–22)
CALCIUM SERPL-MCNC: 9.2 MG/DL (ref 8.4–10.2)
CHLORIDE SERPL-SCNC: 103 MMOL/L (ref 96–112)
CHOLEST SERPL-MCNC: 257 MG/DL (ref 100–199)
CO2 SERPL-SCNC: 26 MMOL/L (ref 20–33)
CREAT SERPL-MCNC: 0.82 MG/DL (ref 0.5–1.4)
EOSINOPHIL # BLD AUTO: 0.13 K/UL (ref 0–0.51)
EOSINOPHIL NFR BLD: 2 % (ref 0–6.9)
ERYTHROCYTE [DISTWIDTH] IN BLOOD BY AUTOMATED COUNT: 44.9 FL (ref 35.9–50)
FASTING STATUS PATIENT QL REPORTED: NORMAL
FERRITIN SERPL-MCNC: 73.8 NG/ML (ref 10–291)
FOLATE SERPL-MCNC: 23.7 NG/ML
GLOBULIN SER CALC-MCNC: 2.5 G/DL (ref 1.9–3.5)
GLUCOSE SERPL-MCNC: 98 MG/DL (ref 65–99)
HCT VFR BLD AUTO: 41.6 % (ref 37–47)
HDLC SERPL-MCNC: 52 MG/DL
HGB BLD-MCNC: 13.6 G/DL (ref 12–16)
IMM GRANULOCYTES # BLD AUTO: 0.02 K/UL (ref 0–0.11)
IMM GRANULOCYTES NFR BLD AUTO: 0.3 % (ref 0–0.9)
IRON SATN MFR SERPL: 32 % (ref 15–55)
IRON SERPL-MCNC: 95 UG/DL (ref 40–170)
LDLC SERPL CALC-MCNC: 159 MG/DL
LYMPHOCYTES # BLD AUTO: 2.8 K/UL (ref 1–4.8)
LYMPHOCYTES NFR BLD: 43.1 % (ref 22–41)
MCH RBC QN AUTO: 31.5 PG (ref 27–33)
MCHC RBC AUTO-ENTMCNC: 32.7 G/DL (ref 33.6–35)
MCV RBC AUTO: 96.3 FL (ref 81.4–97.8)
MONOCYTES # BLD AUTO: 0.58 K/UL (ref 0–0.85)
MONOCYTES NFR BLD AUTO: 8.9 % (ref 0–13.4)
NEUTROPHILS # BLD AUTO: 2.91 K/UL (ref 2–7.15)
NEUTROPHILS NFR BLD: 44.9 % (ref 44–72)
NRBC # BLD AUTO: 0 K/UL
NRBC BLD-RTO: 0 /100 WBC
PLATELET # BLD AUTO: 291 K/UL (ref 164–446)
PMV BLD AUTO: 10.2 FL (ref 9–12.9)
POTASSIUM SERPL-SCNC: 4.6 MMOL/L (ref 3.6–5.5)
PROT SERPL-MCNC: 6.8 G/DL (ref 6–8.2)
RBC # BLD AUTO: 4.32 M/UL (ref 4.2–5.4)
SODIUM SERPL-SCNC: 141 MMOL/L (ref 135–145)
T3FREE SERPL-MCNC: 2.42 PG/ML (ref 2–4.4)
T4 FREE SERPL-MCNC: 1.17 NG/DL (ref 0.93–1.7)
TIBC SERPL-MCNC: 300 UG/DL (ref 250–450)
TRANSFERRIN SERPL-MCNC: 246 MG/DL (ref 200–370)
TRIGL SERPL-MCNC: 229 MG/DL (ref 0–149)
TSH SERPL DL<=0.005 MIU/L-ACNC: 1.46 UIU/ML (ref 0.38–5.33)
UIBC SERPL-MCNC: 205 UG/DL (ref 110–370)
VIT B12 SERPL-MCNC: 2354 PG/ML (ref 211–911)
WBC # BLD AUTO: 6.5 K/UL (ref 4.8–10.8)

## 2020-06-01 PROCEDURE — 82306 VITAMIN D 25 HYDROXY: CPT

## 2020-06-01 PROCEDURE — 80053 COMPREHEN METABOLIC PANEL: CPT

## 2020-06-01 PROCEDURE — 83550 IRON BINDING TEST: CPT

## 2020-06-01 PROCEDURE — 83540 ASSAY OF IRON: CPT

## 2020-06-01 PROCEDURE — 82607 VITAMIN B-12: CPT

## 2020-06-01 PROCEDURE — 84466 ASSAY OF TRANSFERRIN: CPT

## 2020-06-01 PROCEDURE — 84481 FREE ASSAY (FT-3): CPT

## 2020-06-01 PROCEDURE — 82728 ASSAY OF FERRITIN: CPT

## 2020-06-01 PROCEDURE — 36415 COLL VENOUS BLD VENIPUNCTURE: CPT

## 2020-06-01 PROCEDURE — 84439 ASSAY OF FREE THYROXINE: CPT

## 2020-06-01 PROCEDURE — 86800 THYROGLOBULIN ANTIBODY: CPT

## 2020-06-01 PROCEDURE — 84443 ASSAY THYROID STIM HORMONE: CPT

## 2020-06-01 PROCEDURE — 85025 COMPLETE CBC W/AUTO DIFF WBC: CPT

## 2020-06-01 PROCEDURE — 82746 ASSAY OF FOLIC ACID SERUM: CPT

## 2020-06-01 PROCEDURE — 80061 LIPID PANEL: CPT

## 2020-06-02 LAB — THYROGLOB AB SERPL-ACNC: <0.9 IU/ML (ref 0–4)

## 2020-08-27 ENCOUNTER — HOSPITAL ENCOUNTER (OUTPATIENT)
Dept: LAB | Facility: MEDICAL CENTER | Age: 60
End: 2020-08-27
Attending: FAMILY MEDICINE
Payer: COMMERCIAL

## 2020-08-27 LAB
25(OH)D3 SERPL-MCNC: 86 NG/ML (ref 30–100)
ALBUMIN SERPL BCP-MCNC: 4.6 G/DL (ref 3.2–4.9)
ALBUMIN/GLOB SERPL: 2.1 G/DL
ALP SERPL-CCNC: 81 U/L (ref 30–99)
ALT SERPL-CCNC: 16 U/L (ref 2–50)
ANION GAP SERPL CALC-SCNC: 10 MMOL/L (ref 7–16)
AST SERPL-CCNC: 19 U/L (ref 12–45)
BASOPHILS # BLD AUTO: 0.7 % (ref 0–1.8)
BASOPHILS # BLD: 0.09 K/UL (ref 0–0.12)
BILIRUB SERPL-MCNC: 0.6 MG/DL (ref 0.1–1.5)
BUN SERPL-MCNC: 20 MG/DL (ref 8–22)
CALCIUM SERPL-MCNC: 9.2 MG/DL (ref 8.5–10.5)
CHLORIDE SERPL-SCNC: 99 MMOL/L (ref 96–112)
CHOLEST SERPL-MCNC: 275 MG/DL (ref 100–199)
CO2 SERPL-SCNC: 27 MMOL/L (ref 20–33)
CREAT SERPL-MCNC: 0.66 MG/DL (ref 0.5–1.4)
EOSINOPHIL # BLD AUTO: 0.09 K/UL (ref 0–0.51)
EOSINOPHIL NFR BLD: 0.7 % (ref 0–6.9)
ERYTHROCYTE [DISTWIDTH] IN BLOOD BY AUTOMATED COUNT: 43.8 FL (ref 35.9–50)
FASTING STATUS PATIENT QL REPORTED: NORMAL
GLOBULIN SER CALC-MCNC: 2.2 G/DL (ref 1.9–3.5)
GLUCOSE SERPL-MCNC: 97 MG/DL (ref 65–99)
HCT VFR BLD AUTO: 43.5 % (ref 37–47)
HDLC SERPL-MCNC: 53 MG/DL
HGB BLD-MCNC: 14 G/DL (ref 12–16)
IMM GRANULOCYTES # BLD AUTO: 0.03 K/UL (ref 0–0.11)
IMM GRANULOCYTES NFR BLD AUTO: 0.2 % (ref 0–0.9)
LDLC SERPL CALC-MCNC: 169 MG/DL
LYMPHOCYTES # BLD AUTO: 1.71 K/UL (ref 1–4.8)
LYMPHOCYTES NFR BLD: 14 % (ref 22–41)
MCH RBC QN AUTO: 31.7 PG (ref 27–33)
MCHC RBC AUTO-ENTMCNC: 32.2 G/DL (ref 33.6–35)
MCV RBC AUTO: 98.6 FL (ref 81.4–97.8)
MONOCYTES # BLD AUTO: 0.81 K/UL (ref 0–0.85)
MONOCYTES NFR BLD AUTO: 6.6 % (ref 0–13.4)
NEUTROPHILS # BLD AUTO: 9.47 K/UL (ref 2–7.15)
NEUTROPHILS NFR BLD: 77.8 % (ref 44–72)
NRBC # BLD AUTO: 0 K/UL
NRBC BLD-RTO: 0 /100 WBC
PLATELET # BLD AUTO: 331 K/UL (ref 164–446)
PMV BLD AUTO: 10.7 FL (ref 9–12.9)
POTASSIUM SERPL-SCNC: 4 MMOL/L (ref 3.6–5.5)
PROT SERPL-MCNC: 6.8 G/DL (ref 6–8.2)
RBC # BLD AUTO: 4.41 M/UL (ref 4.2–5.4)
SODIUM SERPL-SCNC: 136 MMOL/L (ref 135–145)
T3FREE SERPL-MCNC: 2.92 PG/ML (ref 2–4.4)
T4 FREE SERPL-MCNC: 1.46 NG/DL (ref 0.93–1.7)
TRIGL SERPL-MCNC: 267 MG/DL (ref 0–149)
TSH SERPL DL<=0.005 MIU/L-ACNC: 1.03 UIU/ML (ref 0.38–5.33)
WBC # BLD AUTO: 12.2 K/UL (ref 4.8–10.8)

## 2020-08-27 PROCEDURE — 36415 COLL VENOUS BLD VENIPUNCTURE: CPT

## 2020-08-27 PROCEDURE — 84439 ASSAY OF FREE THYROXINE: CPT

## 2020-08-27 PROCEDURE — 84443 ASSAY THYROID STIM HORMONE: CPT

## 2020-08-27 PROCEDURE — 86800 THYROGLOBULIN ANTIBODY: CPT

## 2020-08-27 PROCEDURE — 80053 COMPREHEN METABOLIC PANEL: CPT

## 2020-08-27 PROCEDURE — 84481 FREE ASSAY (FT-3): CPT

## 2020-08-27 PROCEDURE — 82306 VITAMIN D 25 HYDROXY: CPT

## 2020-08-27 PROCEDURE — 80061 LIPID PANEL: CPT

## 2020-08-27 PROCEDURE — 85025 COMPLETE CBC W/AUTO DIFF WBC: CPT

## 2020-08-29 LAB — THYROGLOB AB SERPL-ACNC: <0.9 IU/ML (ref 0–4)

## 2020-11-24 ENCOUNTER — HOSPITAL ENCOUNTER (OUTPATIENT)
Dept: LAB | Facility: MEDICAL CENTER | Age: 60
End: 2020-11-24
Attending: FAMILY MEDICINE
Payer: COMMERCIAL

## 2020-11-24 LAB
25(OH)D3 SERPL-MCNC: 90 NG/ML (ref 30–100)
ALBUMIN SERPL BCP-MCNC: 4.3 G/DL (ref 3.2–4.9)
ALBUMIN/GLOB SERPL: 1.5 G/DL
ALP SERPL-CCNC: 87 U/L (ref 30–99)
ALT SERPL-CCNC: 20 U/L (ref 2–50)
ANION GAP SERPL CALC-SCNC: 8 MMOL/L (ref 7–16)
AST SERPL-CCNC: 22 U/L (ref 12–45)
BASOPHILS # BLD AUTO: 0.7 % (ref 0–1.8)
BASOPHILS # BLD: 0.05 K/UL (ref 0–0.12)
BILIRUB SERPL-MCNC: 0.5 MG/DL (ref 0.1–1.5)
BUN SERPL-MCNC: 24 MG/DL (ref 8–22)
CALCIUM SERPL-MCNC: 9.5 MG/DL (ref 8.5–10.5)
CHLORIDE SERPL-SCNC: 101 MMOL/L (ref 96–112)
CHOLEST SERPL-MCNC: 233 MG/DL (ref 100–199)
CO2 SERPL-SCNC: 30 MMOL/L (ref 20–33)
CREAT SERPL-MCNC: 0.76 MG/DL (ref 0.5–1.4)
EOSINOPHIL # BLD AUTO: 0.14 K/UL (ref 0–0.51)
EOSINOPHIL NFR BLD: 2 % (ref 0–6.9)
ERYTHROCYTE [DISTWIDTH] IN BLOOD BY AUTOMATED COUNT: 45.1 FL (ref 35.9–50)
FASTING STATUS PATIENT QL REPORTED: NORMAL
GLOBULIN SER CALC-MCNC: 2.9 G/DL (ref 1.9–3.5)
GLUCOSE SERPL-MCNC: 90 MG/DL (ref 65–99)
HCT VFR BLD AUTO: 43.3 % (ref 37–47)
HDLC SERPL-MCNC: 57 MG/DL
HGB BLD-MCNC: 13.7 G/DL (ref 12–16)
IMM GRANULOCYTES # BLD AUTO: 0.01 K/UL (ref 0–0.11)
IMM GRANULOCYTES NFR BLD AUTO: 0.1 % (ref 0–0.9)
LDLC SERPL CALC-MCNC: 148 MG/DL
LYMPHOCYTES # BLD AUTO: 3.1 K/UL (ref 1–4.8)
LYMPHOCYTES NFR BLD: 44.7 % (ref 22–41)
MCH RBC QN AUTO: 31.1 PG (ref 27–33)
MCHC RBC AUTO-ENTMCNC: 31.6 G/DL (ref 33.6–35)
MCV RBC AUTO: 98.4 FL (ref 81.4–97.8)
MONOCYTES # BLD AUTO: 0.66 K/UL (ref 0–0.85)
MONOCYTES NFR BLD AUTO: 9.5 % (ref 0–13.4)
NEUTROPHILS # BLD AUTO: 2.98 K/UL (ref 2–7.15)
NEUTROPHILS NFR BLD: 43 % (ref 44–72)
NRBC # BLD AUTO: 0 K/UL
NRBC BLD-RTO: 0 /100 WBC
PLATELET # BLD AUTO: 317 K/UL (ref 164–446)
PMV BLD AUTO: 11.3 FL (ref 9–12.9)
POTASSIUM SERPL-SCNC: 3.9 MMOL/L (ref 3.6–5.5)
PROT SERPL-MCNC: 7.2 G/DL (ref 6–8.2)
RBC # BLD AUTO: 4.4 M/UL (ref 4.2–5.4)
SODIUM SERPL-SCNC: 139 MMOL/L (ref 135–145)
T3FREE SERPL-MCNC: 2.93 PG/ML (ref 2–4.4)
T4 FREE SERPL-MCNC: 1.19 NG/DL (ref 0.93–1.7)
TRIGL SERPL-MCNC: 141 MG/DL (ref 0–149)
TSH SERPL DL<=0.005 MIU/L-ACNC: 1 UIU/ML (ref 0.38–5.33)
WBC # BLD AUTO: 6.9 K/UL (ref 4.8–10.8)

## 2020-11-24 PROCEDURE — 84439 ASSAY OF FREE THYROXINE: CPT

## 2020-11-24 PROCEDURE — 85025 COMPLETE CBC W/AUTO DIFF WBC: CPT

## 2020-11-24 PROCEDURE — 36415 COLL VENOUS BLD VENIPUNCTURE: CPT

## 2020-11-24 PROCEDURE — 80061 LIPID PANEL: CPT

## 2020-11-24 PROCEDURE — 82306 VITAMIN D 25 HYDROXY: CPT

## 2020-11-24 PROCEDURE — 84443 ASSAY THYROID STIM HORMONE: CPT

## 2020-11-24 PROCEDURE — 84481 FREE ASSAY (FT-3): CPT

## 2020-11-24 PROCEDURE — 80053 COMPREHEN METABOLIC PANEL: CPT

## 2020-11-24 PROCEDURE — 86800 THYROGLOBULIN ANTIBODY: CPT

## 2020-11-25 LAB — THYROGLOB AB SERPL-ACNC: <0.9 IU/ML (ref 0–4)

## 2021-01-06 ENCOUNTER — APPOINTMENT (RX ONLY)
Dept: URBAN - METROPOLITAN AREA CLINIC 35 | Facility: CLINIC | Age: 61
Setting detail: DERMATOLOGY
End: 2021-01-06

## 2021-01-06 DIAGNOSIS — Z41.9 ENCOUNTER FOR PROCEDURE FOR PURPOSES OTHER THAN REMEDYING HEALTH STATE, UNSPECIFIED: ICD-10-CM

## 2021-01-06 PROCEDURE — ? BOTOX

## 2021-01-06 PROCEDURE — ? MEDICAL CONSULTATION: FILLERS

## 2021-01-06 PROCEDURE — ? ADDITIONAL NOTES

## 2021-01-06 ASSESSMENT — LOCATION SIMPLE DESCRIPTION DERM
LOCATION SIMPLE: LEFT TEMPLE
LOCATION SIMPLE: RIGHT TEMPLE

## 2021-01-06 ASSESSMENT — LOCATION DETAILED DESCRIPTION DERM
LOCATION DETAILED: LEFT CENTRAL TEMPLE
LOCATION DETAILED: RIGHT CENTRAL TEMPLE

## 2021-01-06 ASSESSMENT — LOCATION ZONE DERM: LOCATION ZONE: FACE

## 2021-01-06 NOTE — PROCEDURE: BOTOX
Reconstitution Date (Optional): 1/6/21
Expiration Date (Month Year): 5/23
Price (Use Numbers Only, No Special Characters Or $): 900
Post-Care Instructions: Patient instructed to not lie down for 4 hours after injections and limit physical activity for 24 hours.
Masseter Units: 0
Dilution (U/0.1 Cc): 1.1
Additional Area 1 Location: Glabella
Detail Level: Detailed
Consent: Verbal and written informed consent were obtained to include the following risks: pain, swelling, bruising, eyelid or eyebrow droop, and lack of visible improvement of wrinkles in the areas treated.  The skin was cleansed with alcohol. Injections were administered with a 32g needle into the following areas:
Additional Area 1 Units: 25
Additional Area 2 Location: Crows Feet
Additional Area 3 Location: Frontalis
Additional Area 2 Units: 42
Additional Area 4 Location: Bunny lines
Additional Area 3 Units: 8
Additional Area 5 Location: perioral
Additional Area 6 Location: platysma

## 2021-01-06 NOTE — PROCEDURE: ADDITIONAL NOTES
Detail Level: Simple
Render Risk Assessment In Note?: no
Detail Level: Detailed
Additional Notes: Discussed starting with 1 syringe per side in [place].

## 2021-02-03 ENCOUNTER — APPOINTMENT (RX ONLY)
Dept: URBAN - METROPOLITAN AREA CLINIC 35 | Facility: CLINIC | Age: 61
Setting detail: DERMATOLOGY
End: 2021-02-03

## 2021-02-03 DIAGNOSIS — Z41.9 ENCOUNTER FOR PROCEDURE FOR PURPOSES OTHER THAN REMEDYING HEALTH STATE, UNSPECIFIED: ICD-10-CM

## 2021-02-03 PROCEDURE — ? ADDITIONAL NOTES

## 2021-02-03 PROCEDURE — ? COSMETIC FOLLOW-UP

## 2021-02-03 PROCEDURE — ? BOTOX

## 2021-02-03 PROCEDURE — ? FILLERS

## 2021-02-03 NOTE — PROCEDURE: COSMETIC FOLLOW-UP
Treatment (Optional): Botox
Comments (Free Text): Patient added another 25 units of Botox to crows feet. (65 units in crows Feet)\\n\\nDiscussed at next Botox appointment will increase to 100 units
Global Improvement: Good
Detail Level: Zone
Patient Satisfaction: Pleased
Price (Use Numbers Only, No Special Characters Or $): 0
Side Effects Or Complications: None

## 2021-02-03 NOTE — PROCEDURE: FILLERS
Tear Troughs Filler Volume In Cc: 0
Lot #: [id number]
Additional Area 1 Location: Oral Commisures
Use Map Statement For Sites (Optional): Yes
Additional Area 2 Location: Border of lips
Additional Area 4 Location: Scars
Post-Care Instructions: Patient instructed to apply ice to reduce swelling.
[place]s Filler Volume In Cc: 2
Include Cannula Information In Note?: No
Consent: Written consent obtained. Risks include but not limited to bruising, bleeding, blindness, stroke, delayed onset of nodules, irregular texture, ulceration, infection, allergic reaction, scar formation, incomplete augmentation, temporary nature, procedural pain.
Additional Area 5 Location: Earlobes
Additional Area 3 Location: Fine lines around mouth
Expiration Date (Month Year): 12/26/21
Filler: Juvederm Voluma XC
Map Statment: See Attach Map for Complete Details
Detail Level: Detailed
Price (Use Numbers Only, No Special Characters Or $): 1600

## 2021-02-03 NOTE — PROCEDURE: BOTOX
Reconstitution Date (Optional): 2/3/21
Nasal Root Units: 0
Additional Area 1 Location: Glabella
Additional Area 4 Location: Bunny lines
Additional Area 1 Units: 25
Post-Care Instructions: Patient instructed to not lie down for 4 hours after injections and limit physical activity for 24 hours.
Additional Area 6 Location: platysma
Dilution (U/0.1 Cc): 1.1
Additional Area 2 Location: Crows Feet
Detail Level: Detailed
Price (Use Numbers Only, No Special Characters Or $): 300
Consent: Verbal and written informed consent were obtained to include the following risks: pain, swelling, bruising, eyelid or eyebrow droop, and lack of visible improvement of wrinkles in the areas treated.  The skin was cleansed with alcohol. Injections were administered with a 32g needle into the following areas:
Lot #: [id number]
Additional Area 3 Location: Frontalis
Additional Area 5 Location: perioral
Expiration Date (Month Year): 8/23

## 2021-02-18 ENCOUNTER — APPOINTMENT (RX ONLY)
Dept: URBAN - METROPOLITAN AREA CLINIC 35 | Facility: CLINIC | Age: 61
Setting detail: DERMATOLOGY
End: 2021-02-18

## 2021-02-18 DIAGNOSIS — Z41.9 ENCOUNTER FOR PROCEDURE FOR PURPOSES OTHER THAN REMEDYING HEALTH STATE, UNSPECIFIED: ICD-10-CM

## 2021-02-18 PROCEDURE — ? FILLERS

## 2021-02-18 PROCEDURE — ? ADDITIONAL NOTES

## 2021-02-18 PROCEDURE — ? BOTOX

## 2021-02-18 NOTE — PROCEDURE: BOTOX
Additional Area 6 Location: platysma
Additional Area 6 Units: 0
Additional Area 3 Location: Frontalis
Detail Level: Detailed
Expiration Date (Month Year): 8/23
Additional Area 1 Location: Glabella
Price (Use Numbers Only, No Special Characters Or $): 300
Dilution (U/0.1 Cc): 1.1
Additional Area 5 Location: perioral
Reconstitution Date (Optional): 2/18/21
Post-Care Instructions: Patient instructed to not lie down for 4 hours after injections and limit physical activity for 24 hours.
Lot #: [id number]
Consent: Verbal and written informed consent were obtained to include the following risks: pain, swelling, bruising, eyelid or eyebrow droop, and lack of visible improvement of wrinkles in the areas treated.  The skin was cleansed with alcohol. Injections were administered with a 32g needle into the following areas:
Additional Area 2 Location: Crows Feet
Additional Area 4 Location: Bunny lines

## 2021-02-18 NOTE — PROCEDURE: FILLERS
Additional Area 4 Volume In Cc: 0
Additional Area 3 Location: Fine lines around mouth
Expiration Date (Month Year): 2/9/22
Additional Area 1 Location: Oral Commisures
Additional Area 4 Location: Scars
Include Cannula Information In Note?: No
Filler: Juvederm Voluma XC
Additional Area 2 Location: Border of lips
Additional Area 5 Location: Earlobes
Map Statment: See Attach Map for Complete Details
Lot #: [id number]
Post-Care Instructions: Patient instructed to apply ice to reduce swelling.
[place] Filler Volume In Cc: 2
Consent: Written consent obtained. Risks include but not limited to bruising, bleeding, blindness, stroke, delayed onset of nodules, irregular texture, ulceration, infection, allergic reaction, scar formation, incomplete augmentation, temporary nature, procedural pain.
Price (Use Numbers Only, No Special Characters Or $): 1600
Use Map Statement For Sites (Optional): Yes
Detail Level: Detailed

## 2021-02-24 ENCOUNTER — APPOINTMENT (RX ONLY)
Dept: URBAN - METROPOLITAN AREA CLINIC 35 | Facility: CLINIC | Age: 61
Setting detail: DERMATOLOGY
End: 2021-02-24

## 2021-03-11 ENCOUNTER — APPOINTMENT (RX ONLY)
Dept: URBAN - METROPOLITAN AREA CLINIC 35 | Facility: CLINIC | Age: 61
Setting detail: DERMATOLOGY
End: 2021-03-11

## 2021-03-11 PROCEDURE — ? HYALURONIDASE INJECTION

## 2021-03-11 PROCEDURE — ? ADDITIONAL NOTES

## 2021-03-12 DIAGNOSIS — Z41.9 ENCOUNTER FOR PROCEDURE FOR PURPOSES OTHER THAN REMEDYING HEALTH STATE, UNSPECIFIED: ICD-10-CM

## 2021-03-12 ASSESSMENT — LOCATION SIMPLE DESCRIPTION DERM
LOCATION SIMPLE: RIGHT CHEEK
LOCATION SIMPLE: LEFT CHEEK

## 2021-03-12 ASSESSMENT — LOCATION DETAILED DESCRIPTION DERM
LOCATION DETAILED: LEFT SUPERIOR CENTRAL MALAR CHEEK
LOCATION DETAILED: RIGHT CENTRAL MALAR CHEEK

## 2021-03-12 ASSESSMENT — LOCATION ZONE DERM: LOCATION ZONE: FACE

## 2021-03-12 NOTE — PROCEDURE: HYALURONIDASE INJECTION
Detail Level: Detailed
Price (Use Numbers Only, No Special Characters Or $): 150
Hyaluronidase Preparation: hyaluronidase
Total Volume (Ccs): 0.2
Lot # (Optional): [id number]
Expiration Date (Optional): 9/30/21
Consent: The risks of contour defects and dimpling of the skin were reviewed with the patient prior to the injection.

## 2021-03-12 NOTE — PROCEDURE: ADDITIONAL NOTES
Render Risk Assessment In Note?: no
Detail Level: Detailed
Additional Notes: 0.2 cc to 1 cc of hylenex and injected into tear troughs

## 2021-03-15 DIAGNOSIS — Z23 NEED FOR VACCINATION: ICD-10-CM

## 2021-03-23 ENCOUNTER — HOSPITAL ENCOUNTER (OUTPATIENT)
Dept: LAB | Facility: MEDICAL CENTER | Age: 61
End: 2021-03-23
Attending: FAMILY MEDICINE
Payer: COMMERCIAL

## 2021-03-23 LAB
ALBUMIN SERPL BCP-MCNC: 4.2 G/DL (ref 3.2–4.9)
ALBUMIN/GLOB SERPL: 1.4 G/DL
ALP SERPL-CCNC: 89 U/L (ref 30–99)
ALT SERPL-CCNC: 16 U/L (ref 2–50)
ANION GAP SERPL CALC-SCNC: 8 MMOL/L (ref 7–16)
AST SERPL-CCNC: 20 U/L (ref 12–45)
BASOPHILS # BLD AUTO: 1.1 % (ref 0–1.8)
BASOPHILS # BLD: 0.06 K/UL (ref 0–0.12)
BILIRUB SERPL-MCNC: 0.4 MG/DL (ref 0.1–1.5)
BUN SERPL-MCNC: 24 MG/DL (ref 8–22)
CALCIUM SERPL-MCNC: 9.6 MG/DL (ref 8.5–10.5)
CHLORIDE SERPL-SCNC: 102 MMOL/L (ref 96–112)
CHOLEST SERPL-MCNC: 245 MG/DL (ref 100–199)
CO2 SERPL-SCNC: 30 MMOL/L (ref 20–33)
CREAT SERPL-MCNC: 0.82 MG/DL (ref 0.5–1.4)
EOSINOPHIL # BLD AUTO: 0.13 K/UL (ref 0–0.51)
EOSINOPHIL NFR BLD: 2.3 % (ref 0–6.9)
ERYTHROCYTE [DISTWIDTH] IN BLOOD BY AUTOMATED COUNT: 43.2 FL (ref 35.9–50)
GLOBULIN SER CALC-MCNC: 3 G/DL (ref 1.9–3.5)
GLUCOSE SERPL-MCNC: 92 MG/DL (ref 65–99)
HCT VFR BLD AUTO: 44 % (ref 37–47)
HDLC SERPL-MCNC: 50 MG/DL
HGB BLD-MCNC: 13.9 G/DL (ref 12–16)
IMM GRANULOCYTES # BLD AUTO: 0.01 K/UL (ref 0–0.11)
IMM GRANULOCYTES NFR BLD AUTO: 0.2 % (ref 0–0.9)
LDLC SERPL CALC-MCNC: 161 MG/DL
LYMPHOCYTES # BLD AUTO: 2.71 K/UL (ref 1–4.8)
LYMPHOCYTES NFR BLD: 48.2 % (ref 22–41)
MCH RBC QN AUTO: 30.4 PG (ref 27–33)
MCHC RBC AUTO-ENTMCNC: 31.6 G/DL (ref 33.6–35)
MCV RBC AUTO: 96.3 FL (ref 81.4–97.8)
MONOCYTES # BLD AUTO: 0.45 K/UL (ref 0–0.85)
MONOCYTES NFR BLD AUTO: 8 % (ref 0–13.4)
NEUTROPHILS # BLD AUTO: 2.26 K/UL (ref 2–7.15)
NEUTROPHILS NFR BLD: 40.2 % (ref 44–72)
NRBC # BLD AUTO: 0 K/UL
NRBC BLD-RTO: 0 /100 WBC
PLATELET # BLD AUTO: 312 K/UL (ref 164–446)
PMV BLD AUTO: 10.8 FL (ref 9–12.9)
POTASSIUM SERPL-SCNC: 4 MMOL/L (ref 3.6–5.5)
PROT SERPL-MCNC: 7.2 G/DL (ref 6–8.2)
RBC # BLD AUTO: 4.57 M/UL (ref 4.2–5.4)
SODIUM SERPL-SCNC: 140 MMOL/L (ref 135–145)
TRIGL SERPL-MCNC: 170 MG/DL (ref 0–149)
WBC # BLD AUTO: 5.6 K/UL (ref 4.8–10.8)

## 2021-03-23 PROCEDURE — 82306 VITAMIN D 25 HYDROXY: CPT

## 2021-03-23 PROCEDURE — 84439 ASSAY OF FREE THYROXINE: CPT

## 2021-03-23 PROCEDURE — 86800 THYROGLOBULIN ANTIBODY: CPT

## 2021-03-23 PROCEDURE — 84443 ASSAY THYROID STIM HORMONE: CPT

## 2021-03-23 PROCEDURE — 36415 COLL VENOUS BLD VENIPUNCTURE: CPT

## 2021-03-23 PROCEDURE — 84481 FREE ASSAY (FT-3): CPT

## 2021-03-23 PROCEDURE — 80061 LIPID PANEL: CPT

## 2021-03-23 PROCEDURE — 80053 COMPREHEN METABOLIC PANEL: CPT

## 2021-03-23 PROCEDURE — 85025 COMPLETE CBC W/AUTO DIFF WBC: CPT

## 2021-03-24 LAB
25(OH)D3 SERPL-MCNC: 96 NG/ML (ref 30–100)
T3FREE SERPL-MCNC: 2.54 PG/ML (ref 2–4.4)
T4 FREE SERPL-MCNC: 1.34 NG/DL (ref 0.93–1.7)
TSH SERPL DL<=0.005 MIU/L-ACNC: 0.77 UIU/ML (ref 0.38–5.33)

## 2021-03-25 ENCOUNTER — APPOINTMENT (RX ONLY)
Dept: URBAN - METROPOLITAN AREA CLINIC 35 | Facility: CLINIC | Age: 61
Setting detail: DERMATOLOGY
End: 2021-03-25

## 2021-03-25 DIAGNOSIS — Z41.9 ENCOUNTER FOR PROCEDURE FOR PURPOSES OTHER THAN REMEDYING HEALTH STATE, UNSPECIFIED: ICD-10-CM

## 2021-03-25 LAB — THYROGLOB AB SERPL-ACNC: <0.9 IU/ML (ref 0–4)

## 2021-03-25 PROCEDURE — ? FILLERS

## 2021-03-25 NOTE — PROCEDURE: FILLERS
Additional Area 3 Location: Fine lines around mouth
Include Cannula Information In Note?: No
Additional Area 1 Location: Oral Commisures
Additional Area 1 Volume In Cc: 0
Additional Area 4 Location: Scars
Filler: Juvederm Voluma XC
Additional Area 5 Location: Earlobes
Detail Level: Detailed
Additional Area 2 Location: Border of lips
Map Statment: See Attach Map for Complete Details
Price (Use Numbers Only, No Special Characters Or $): 1600
Filler Comments: 0.5 cc right temple \\n1.5 cc left temple
Use Map Statement For Sites (Optional): Yes
Post-Care Instructions: Patient instructed to apply ice to reduce swelling.
Consent: Written consent obtained. Risks include but not limited to bruising, bleeding, blindness, stroke, delayed onset of nodules, irregular texture, ulceration, infection, allergic reaction, scar formation, incomplete augmentation, temporary nature, procedural pain.

## 2021-04-06 ENCOUNTER — APPOINTMENT (RX ONLY)
Dept: URBAN - METROPOLITAN AREA CLINIC 35 | Facility: CLINIC | Age: 61
Setting detail: DERMATOLOGY
End: 2021-04-06

## 2021-04-06 DIAGNOSIS — Z41.9 ENCOUNTER FOR PROCEDURE FOR PURPOSES OTHER THAN REMEDYING HEALTH STATE, UNSPECIFIED: ICD-10-CM

## 2021-04-06 PROCEDURE — ? ADDITIONAL NOTES

## 2021-04-06 PROCEDURE — ? BOTOX

## 2021-04-06 PROCEDURE — ? FILLERS

## 2021-04-06 NOTE — PROCEDURE: FILLERS
Additional Area 3 Location: Fine lines around mouth
[place] Lines Filler Volume In Cc: 0
Additional Area 5 Location: Earlobes
Additional Area 2 Location: Border of lips
Additional Area 1 Location: Oral Commisures
Additional Area 4 Location: Scars
Filler Comments: 1 cc fanning to CK1, CK2, CK4 and temples per side
Price (Use Numbers Only, No Special Characters Or $): 1600
Map Statment: See Attach Map for Complete Details
Include Cannula Information In Note?: No
Detail Level: Detailed
Use Map Statement For Sites (Optional): Yes
Filler: Juvederm Voluma XC
Include Cannula Size?: 25G
Include Cannula Length?: 1.5 inch
Consent: Written consent obtained. Risks include but not limited to bruising, bleeding, blindness, stroke, delayed onset of nodules, irregular texture, ulceration, infection, allergic reaction, scar formation, incomplete augmentation, temporary nature, procedural pain.
Lot #: [id number]
Post-Care Instructions: Patient instructed to apply ice to reduce swelling.
Expiration Date (Month Year): 3/12/22

## 2021-04-06 NOTE — PROCEDURE: BOTOX
Additional Area 6 Location: platysma
Additional Area 6 Units: 0
Additional Area 3 Units: 14
Additional Area 3 Location: Frontalis
Detail Level: Detailed
Expiration Date (Month Year): 8/23
Additional Area 1 Location: Glabella
Price (Use Numbers Only, No Special Characters Or $): 1200
Dilution (U/0.1 Cc): 1.1
Additional Area 5 Location: perioral
Reconstitution Date (Optional): 2/18/21
Post-Care Instructions: Patient instructed to not lie down for 4 hours after injections and limit physical activity for 24 hours.
Lot #: [id number]
Additional Area 2 Units: 60
Additional Area 1 Units: 25
Consent: Verbal and written informed consent were obtained to include the following risks: pain, swelling, bruising, eyelid or eyebrow droop, and lack of visible improvement of wrinkles in the areas treated.  The skin was cleansed with alcohol. Injections were administered with a 32g needle into the following areas:
Additional Area 2 Location: Crows Feet
Additional Area 4 Location: Bunny lines

## 2021-04-22 ENCOUNTER — APPOINTMENT (RX ONLY)
Dept: URBAN - METROPOLITAN AREA CLINIC 35 | Facility: CLINIC | Age: 61
Setting detail: DERMATOLOGY
End: 2021-04-22

## 2021-04-22 DIAGNOSIS — Z41.9 ENCOUNTER FOR PROCEDURE FOR PURPOSES OTHER THAN REMEDYING HEALTH STATE, UNSPECIFIED: ICD-10-CM

## 2021-04-22 PROCEDURE — ? COSMETIC FOLLOW-UP

## 2021-04-22 NOTE — PROCEDURE: COSMETIC FOLLOW-UP
Side Effects Or Complications: None
Price (Use Numbers Only, No Special Characters Or $): 0
Global Improvement: Good
Detail Level: Zone
Patient Satisfaction: Pleased
Treatment (Optional): Botox

## 2021-05-03 ENCOUNTER — HOSPITAL ENCOUNTER (OUTPATIENT)
Dept: LAB | Facility: MEDICAL CENTER | Age: 61
End: 2021-05-03
Attending: PLASTIC SURGERY
Payer: COMMERCIAL

## 2021-05-03 LAB
COVID ORDER STATUS COVID19: NORMAL
SARS-COV-2 RNA RESP QL NAA+PROBE: NOTDETECTED
SPECIMEN SOURCE: NORMAL

## 2021-05-03 PROCEDURE — U0005 INFEC AGEN DETEC AMPLI PROBE: HCPCS

## 2021-05-03 PROCEDURE — C9803 HOPD COVID-19 SPEC COLLECT: HCPCS

## 2021-05-03 PROCEDURE — U0003 INFECTIOUS AGENT DETECTION BY NUCLEIC ACID (DNA OR RNA); SEVERE ACUTE RESPIRATORY SYNDROME CORONAVIRUS 2 (SARS-COV-2) (CORONAVIRUS DISEASE [COVID-19]), AMPLIFIED PROBE TECHNIQUE, MAKING USE OF HIGH THROUGHPUT TECHNOLOGIES AS DESCRIBED BY CMS-2020-01-R: HCPCS

## 2021-06-17 ENCOUNTER — HOSPITAL ENCOUNTER (OUTPATIENT)
Dept: LAB | Facility: MEDICAL CENTER | Age: 61
End: 2021-06-17
Attending: FAMILY MEDICINE
Payer: COMMERCIAL

## 2021-06-17 LAB
25(OH)D3 SERPL-MCNC: 93 NG/ML (ref 30–100)
ALBUMIN SERPL BCP-MCNC: 4.1 G/DL (ref 3.2–4.9)
ALBUMIN/GLOB SERPL: 1.5 G/DL
ALP SERPL-CCNC: 93 U/L (ref 30–99)
ALT SERPL-CCNC: 15 U/L (ref 2–50)
ANION GAP SERPL CALC-SCNC: 8 MMOL/L (ref 7–16)
AST SERPL-CCNC: 16 U/L (ref 12–45)
BASOPHILS # BLD AUTO: 1 % (ref 0–1.8)
BASOPHILS # BLD: 0.06 K/UL (ref 0–0.12)
BILIRUB SERPL-MCNC: 0.4 MG/DL (ref 0.1–1.5)
BUN SERPL-MCNC: 28 MG/DL (ref 8–22)
CALCIUM SERPL-MCNC: 9.4 MG/DL (ref 8.5–10.5)
CHLORIDE SERPL-SCNC: 104 MMOL/L (ref 96–112)
CHOLEST SERPL-MCNC: 264 MG/DL (ref 100–199)
CO2 SERPL-SCNC: 28 MMOL/L (ref 20–33)
CREAT SERPL-MCNC: 0.68 MG/DL (ref 0.5–1.4)
EOSINOPHIL # BLD AUTO: 0.13 K/UL (ref 0–0.51)
EOSINOPHIL NFR BLD: 2.2 % (ref 0–6.9)
ERYTHROCYTE [DISTWIDTH] IN BLOOD BY AUTOMATED COUNT: 46.6 FL (ref 35.9–50)
FASTING STATUS PATIENT QL REPORTED: NORMAL
GLOBULIN SER CALC-MCNC: 2.7 G/DL (ref 1.9–3.5)
GLUCOSE SERPL-MCNC: 93 MG/DL (ref 65–99)
HCT VFR BLD AUTO: 41.9 % (ref 37–47)
HDLC SERPL-MCNC: 59 MG/DL
HGB BLD-MCNC: 13.5 G/DL (ref 12–16)
IMM GRANULOCYTES # BLD AUTO: 0.01 K/UL (ref 0–0.11)
IMM GRANULOCYTES NFR BLD AUTO: 0.2 % (ref 0–0.9)
LDLC SERPL CALC-MCNC: 170 MG/DL
LYMPHOCYTES # BLD AUTO: 2.46 K/UL (ref 1–4.8)
LYMPHOCYTES NFR BLD: 42.1 % (ref 22–41)
MCH RBC QN AUTO: 31.1 PG (ref 27–33)
MCHC RBC AUTO-ENTMCNC: 32.2 G/DL (ref 33.6–35)
MCV RBC AUTO: 96.5 FL (ref 81.4–97.8)
MONOCYTES # BLD AUTO: 0.49 K/UL (ref 0–0.85)
MONOCYTES NFR BLD AUTO: 8.4 % (ref 0–13.4)
NEUTROPHILS # BLD AUTO: 2.69 K/UL (ref 2–7.15)
NEUTROPHILS NFR BLD: 46.1 % (ref 44–72)
NRBC # BLD AUTO: 0 K/UL
NRBC BLD-RTO: 0 /100 WBC
PLATELET # BLD AUTO: 323 K/UL (ref 164–446)
PMV BLD AUTO: 10.9 FL (ref 9–12.9)
POTASSIUM SERPL-SCNC: 3.8 MMOL/L (ref 3.6–5.5)
PROT SERPL-MCNC: 6.8 G/DL (ref 6–8.2)
RBC # BLD AUTO: 4.34 M/UL (ref 4.2–5.4)
SODIUM SERPL-SCNC: 140 MMOL/L (ref 135–145)
T3FREE SERPL-MCNC: 2.83 PG/ML (ref 2–4.4)
T4 FREE SERPL-MCNC: 0.99 NG/DL (ref 0.93–1.7)
TRIGL SERPL-MCNC: 173 MG/DL (ref 0–149)
TSH SERPL DL<=0.005 MIU/L-ACNC: 0.51 UIU/ML (ref 0.38–5.33)
WBC # BLD AUTO: 5.8 K/UL (ref 4.8–10.8)

## 2021-06-17 PROCEDURE — 36415 COLL VENOUS BLD VENIPUNCTURE: CPT

## 2021-06-17 PROCEDURE — 80053 COMPREHEN METABOLIC PANEL: CPT

## 2021-06-17 PROCEDURE — 85025 COMPLETE CBC W/AUTO DIFF WBC: CPT

## 2021-06-17 PROCEDURE — 80061 LIPID PANEL: CPT

## 2021-06-17 PROCEDURE — 84481 FREE ASSAY (FT-3): CPT

## 2021-06-17 PROCEDURE — 86800 THYROGLOBULIN ANTIBODY: CPT

## 2021-06-17 PROCEDURE — 84439 ASSAY OF FREE THYROXINE: CPT

## 2021-06-17 PROCEDURE — 82306 VITAMIN D 25 HYDROXY: CPT

## 2021-06-17 PROCEDURE — 84443 ASSAY THYROID STIM HORMONE: CPT

## 2021-06-19 LAB — THYROGLOB AB SERPL-ACNC: <0.9 IU/ML (ref 0–4)

## 2021-06-30 ENCOUNTER — APPOINTMENT (RX ONLY)
Dept: URBAN - METROPOLITAN AREA CLINIC 35 | Facility: CLINIC | Age: 61
Setting detail: DERMATOLOGY
End: 2021-06-30

## 2021-06-30 DIAGNOSIS — Z41.9 ENCOUNTER FOR PROCEDURE FOR PURPOSES OTHER THAN REMEDYING HEALTH STATE, UNSPECIFIED: ICD-10-CM

## 2021-06-30 PROCEDURE — ? ADDITIONAL NOTES

## 2021-06-30 PROCEDURE — ? BOTOX

## 2021-06-30 NOTE — PROCEDURE: BOTOX
Additional Area 6 Location: platysma
Additional Area 6 Units: 0
Additional Area 3 Units: 14
Additional Area 3 Location: Frontalis
Detail Level: Detailed
Expiration Date (Month Year): 11/23
Additional Area 1 Location: Glabella
Price (Use Numbers Only, No Special Characters Or $): 1200
Dilution (U/0.1 Cc): 1.1
Additional Area 5 Location: perioral
Reconstitution Date (Optional): 6/30/21
Post-Care Instructions: Patient instructed to not lie down for 4 hours after injections and limit physical activity for 24 hours.
Lot #: [id number]
Additional Area 2 Units: 60
Additional Area 1 Units: 25
Consent: Verbal and written informed consent were obtained to include the following risks: pain, swelling, bruising, eyelid or eyebrow droop, and lack of visible improvement of wrinkles in the areas treated.  The skin was cleansed with alcohol. Injections were administered with a 32g needle into the following areas:
Additional Area 2 Location: Crows Feet
Additional Area 4 Location: Bunny lines

## 2021-07-13 ENCOUNTER — APPOINTMENT (RX ONLY)
Dept: URBAN - METROPOLITAN AREA CLINIC 35 | Facility: CLINIC | Age: 61
Setting detail: DERMATOLOGY
End: 2021-07-13

## 2021-07-13 DIAGNOSIS — Z41.9 ENCOUNTER FOR PROCEDURE FOR PURPOSES OTHER THAN REMEDYING HEALTH STATE, UNSPECIFIED: ICD-10-CM

## 2021-07-13 PROCEDURE — ? COSMETIC FOLLOW-UP

## 2021-07-13 NOTE — PROCEDURE: COSMETIC FOLLOW-UP
Patient Satisfaction: Pleased
Price (Use Numbers Only, No Special Characters Or $): 0
Treatment (Optional): Botox
Detail Level: Zone
Side Effects Or Complications: None
Global Improvement: Good

## 2021-08-25 ENCOUNTER — HOSPITAL ENCOUNTER (OUTPATIENT)
Dept: RADIOLOGY | Facility: MEDICAL CENTER | Age: 61
End: 2021-08-25
Attending: ORTHOPAEDIC SURGERY
Payer: COMMERCIAL

## 2021-08-25 DIAGNOSIS — M25.511 RIGHT SHOULDER PAIN, UNSPECIFIED CHRONICITY: ICD-10-CM

## 2021-08-25 PROCEDURE — 73221 MRI JOINT UPR EXTREM W/O DYE: CPT | Mod: RT

## 2021-12-16 ENCOUNTER — HOSPITAL ENCOUNTER (OUTPATIENT)
Dept: LAB | Facility: MEDICAL CENTER | Age: 61
End: 2021-12-16
Attending: FAMILY MEDICINE
Payer: COMMERCIAL

## 2021-12-16 LAB
ALBUMIN SERPL BCP-MCNC: 4.6 G/DL (ref 3.2–4.9)
ALBUMIN/GLOB SERPL: 1.9 G/DL
ALP SERPL-CCNC: 98 U/L (ref 30–99)
ALT SERPL-CCNC: 15 U/L (ref 2–50)
ANION GAP SERPL CALC-SCNC: 10 MMOL/L (ref 7–16)
AST SERPL-CCNC: 21 U/L (ref 12–45)
BASOPHILS # BLD AUTO: 1.1 % (ref 0–1.8)
BASOPHILS # BLD: 0.07 K/UL (ref 0–0.12)
BILIRUB SERPL-MCNC: 0.5 MG/DL (ref 0.1–1.5)
BUN SERPL-MCNC: 25 MG/DL (ref 8–22)
CALCIUM SERPL-MCNC: 9.2 MG/DL (ref 8.5–10.5)
CHLORIDE SERPL-SCNC: 103 MMOL/L (ref 96–112)
CHOLEST SERPL-MCNC: 295 MG/DL (ref 100–199)
CO2 SERPL-SCNC: 27 MMOL/L (ref 20–33)
CREAT SERPL-MCNC: 0.62 MG/DL (ref 0.5–1.4)
EOSINOPHIL # BLD AUTO: 0.09 K/UL (ref 0–0.51)
EOSINOPHIL NFR BLD: 1.4 % (ref 0–6.9)
ERYTHROCYTE [DISTWIDTH] IN BLOOD BY AUTOMATED COUNT: 47.1 FL (ref 35.9–50)
FASTING STATUS PATIENT QL REPORTED: NORMAL
GLOBULIN SER CALC-MCNC: 2.4 G/DL (ref 1.9–3.5)
GLUCOSE SERPL-MCNC: 99 MG/DL (ref 65–99)
HCT VFR BLD AUTO: 44.7 % (ref 37–47)
HDLC SERPL-MCNC: 63 MG/DL
HGB BLD-MCNC: 14.7 G/DL (ref 12–16)
IMM GRANULOCYTES # BLD AUTO: 0.02 K/UL (ref 0–0.11)
IMM GRANULOCYTES NFR BLD AUTO: 0.3 % (ref 0–0.9)
LDLC SERPL CALC-MCNC: 192 MG/DL
LYMPHOCYTES # BLD AUTO: 2.85 K/UL (ref 1–4.8)
LYMPHOCYTES NFR BLD: 43.1 % (ref 22–41)
MCH RBC QN AUTO: 32.1 PG (ref 27–33)
MCHC RBC AUTO-ENTMCNC: 32.9 G/DL (ref 33.6–35)
MCV RBC AUTO: 97.6 FL (ref 81.4–97.8)
MONOCYTES # BLD AUTO: 0.6 K/UL (ref 0–0.85)
MONOCYTES NFR BLD AUTO: 9.1 % (ref 0–13.4)
NEUTROPHILS # BLD AUTO: 2.99 K/UL (ref 2–7.15)
NEUTROPHILS NFR BLD: 45 % (ref 44–72)
NRBC # BLD AUTO: 0 K/UL
NRBC BLD-RTO: 0 /100 WBC
PLATELET # BLD AUTO: 348 K/UL (ref 164–446)
PMV BLD AUTO: 10.8 FL (ref 9–12.9)
POTASSIUM SERPL-SCNC: 3.9 MMOL/L (ref 3.6–5.5)
PROT SERPL-MCNC: 7 G/DL (ref 6–8.2)
RBC # BLD AUTO: 4.58 M/UL (ref 4.2–5.4)
SODIUM SERPL-SCNC: 140 MMOL/L (ref 135–145)
T3FREE SERPL-MCNC: 2.68 PG/ML (ref 2–4.4)
T4 FREE SERPL-MCNC: 1 NG/DL (ref 0.93–1.7)
TRIGL SERPL-MCNC: 201 MG/DL (ref 0–149)
TSH SERPL DL<=0.005 MIU/L-ACNC: 0.84 UIU/ML (ref 0.38–5.33)
WBC # BLD AUTO: 6.6 K/UL (ref 4.8–10.8)

## 2021-12-16 PROCEDURE — 80061 LIPID PANEL: CPT

## 2021-12-16 PROCEDURE — 85025 COMPLETE CBC W/AUTO DIFF WBC: CPT

## 2021-12-16 PROCEDURE — 84443 ASSAY THYROID STIM HORMONE: CPT

## 2021-12-16 PROCEDURE — 84481 FREE ASSAY (FT-3): CPT

## 2021-12-16 PROCEDURE — 80053 COMPREHEN METABOLIC PANEL: CPT

## 2021-12-16 PROCEDURE — 36415 COLL VENOUS BLD VENIPUNCTURE: CPT

## 2021-12-16 PROCEDURE — 84439 ASSAY OF FREE THYROXINE: CPT

## 2022-04-08 ENCOUNTER — HOSPITAL ENCOUNTER (OUTPATIENT)
Dept: LAB | Facility: MEDICAL CENTER | Age: 62
End: 2022-04-08
Attending: FAMILY MEDICINE
Payer: COMMERCIAL

## 2022-04-08 LAB
ALBUMIN SERPL BCP-MCNC: 4.6 G/DL (ref 3.2–4.9)
ALBUMIN/GLOB SERPL: 1.8 G/DL
ALP SERPL-CCNC: 96 U/L (ref 30–99)
ALT SERPL-CCNC: 12 U/L (ref 2–50)
ANION GAP SERPL CALC-SCNC: 13 MMOL/L (ref 7–16)
AST SERPL-CCNC: 17 U/L (ref 12–45)
BASOPHILS # BLD AUTO: 0.7 % (ref 0–1.8)
BASOPHILS # BLD: 0.05 K/UL (ref 0–0.12)
BILIRUB SERPL-MCNC: 0.4 MG/DL (ref 0.1–1.5)
BUN SERPL-MCNC: 25 MG/DL (ref 8–22)
CALCIUM SERPL-MCNC: 9.8 MG/DL (ref 8.5–10.5)
CHLORIDE SERPL-SCNC: 105 MMOL/L (ref 96–112)
CHOLEST SERPL-MCNC: 274 MG/DL (ref 100–199)
CO2 SERPL-SCNC: 23 MMOL/L (ref 20–33)
CREAT SERPL-MCNC: 0.79 MG/DL (ref 0.5–1.4)
EOSINOPHIL # BLD AUTO: 0.11 K/UL (ref 0–0.51)
EOSINOPHIL NFR BLD: 1.6 % (ref 0–6.9)
ERYTHROCYTE [DISTWIDTH] IN BLOOD BY AUTOMATED COUNT: 45.9 FL (ref 35.9–50)
FASTING STATUS PATIENT QL REPORTED: NORMAL
GFR SERPLBLD CREATININE-BSD FMLA CKD-EPI: 86 ML/MIN/1.73 M 2
GLOBULIN SER CALC-MCNC: 2.6 G/DL (ref 1.9–3.5)
GLUCOSE SERPL-MCNC: 103 MG/DL (ref 65–99)
HCT VFR BLD AUTO: 45.1 % (ref 37–47)
HDLC SERPL-MCNC: 58 MG/DL
HGB BLD-MCNC: 14.4 G/DL (ref 12–16)
IMM GRANULOCYTES # BLD AUTO: 0.02 K/UL (ref 0–0.11)
IMM GRANULOCYTES NFR BLD AUTO: 0.3 % (ref 0–0.9)
LDLC SERPL CALC-MCNC: 181 MG/DL
LYMPHOCYTES # BLD AUTO: 2.49 K/UL (ref 1–4.8)
LYMPHOCYTES NFR BLD: 37.2 % (ref 22–41)
MCH RBC QN AUTO: 30.6 PG (ref 27–33)
MCHC RBC AUTO-ENTMCNC: 31.9 G/DL (ref 33.6–35)
MCV RBC AUTO: 96 FL (ref 81.4–97.8)
MONOCYTES # BLD AUTO: 0.59 K/UL (ref 0–0.85)
MONOCYTES NFR BLD AUTO: 8.8 % (ref 0–13.4)
NEUTROPHILS # BLD AUTO: 3.44 K/UL (ref 2–7.15)
NEUTROPHILS NFR BLD: 51.4 % (ref 44–72)
NRBC # BLD AUTO: 0 K/UL
NRBC BLD-RTO: 0 /100 WBC
PLATELET # BLD AUTO: 339 K/UL (ref 164–446)
PMV BLD AUTO: 11 FL (ref 9–12.9)
POTASSIUM SERPL-SCNC: 4.2 MMOL/L (ref 3.6–5.5)
PROT SERPL-MCNC: 7.2 G/DL (ref 6–8.2)
RBC # BLD AUTO: 4.7 M/UL (ref 4.2–5.4)
SODIUM SERPL-SCNC: 141 MMOL/L (ref 135–145)
TRIGL SERPL-MCNC: 177 MG/DL (ref 0–149)
WBC # BLD AUTO: 6.7 K/UL (ref 4.8–10.8)

## 2022-04-08 PROCEDURE — 80061 LIPID PANEL: CPT

## 2022-04-08 PROCEDURE — 85025 COMPLETE CBC W/AUTO DIFF WBC: CPT

## 2022-04-08 PROCEDURE — 82306 VITAMIN D 25 HYDROXY: CPT

## 2022-04-08 PROCEDURE — 36415 COLL VENOUS BLD VENIPUNCTURE: CPT

## 2022-04-08 PROCEDURE — 84481 FREE ASSAY (FT-3): CPT

## 2022-04-08 PROCEDURE — 84439 ASSAY OF FREE THYROXINE: CPT

## 2022-04-08 PROCEDURE — 80053 COMPREHEN METABOLIC PANEL: CPT

## 2022-04-08 PROCEDURE — 84443 ASSAY THYROID STIM HORMONE: CPT

## 2022-04-08 PROCEDURE — 86800 THYROGLOBULIN ANTIBODY: CPT

## 2022-04-09 LAB
25(OH)D3 SERPL-MCNC: 64 NG/ML (ref 30–100)
T3FREE SERPL-MCNC: 2.57 PG/ML (ref 2–4.4)
T4 FREE SERPL-MCNC: 1.05 NG/DL (ref 0.93–1.7)
TSH SERPL DL<=0.005 MIU/L-ACNC: 0.53 UIU/ML (ref 0.38–5.33)

## 2022-04-12 LAB — THYROGLOB AB SERPL-ACNC: <0.9 IU/ML (ref 0–4)

## 2022-07-19 ENCOUNTER — HOSPITAL ENCOUNTER (OUTPATIENT)
Dept: RADIOLOGY | Facility: MEDICAL CENTER | Age: 62
End: 2022-07-19
Attending: FAMILY MEDICINE
Payer: COMMERCIAL

## 2022-07-19 DIAGNOSIS — Z12.31 VISIT FOR SCREENING MAMMOGRAM: ICD-10-CM

## 2022-07-19 PROCEDURE — 77063 BREAST TOMOSYNTHESIS BI: CPT

## 2022-07-25 ENCOUNTER — HOSPITAL ENCOUNTER (OUTPATIENT)
Dept: LAB | Facility: MEDICAL CENTER | Age: 62
End: 2022-07-25
Attending: FAMILY MEDICINE
Payer: COMMERCIAL

## 2022-07-25 LAB
25(OH)D3 SERPL-MCNC: 63 NG/ML (ref 30–100)
ALBUMIN SERPL BCP-MCNC: 4.3 G/DL (ref 3.2–4.9)
ALBUMIN/GLOB SERPL: 1.7 G/DL
ALP SERPL-CCNC: 94 U/L (ref 30–99)
ALT SERPL-CCNC: 12 U/L (ref 2–50)
ANION GAP SERPL CALC-SCNC: 9 MMOL/L (ref 7–16)
AST SERPL-CCNC: 20 U/L (ref 12–45)
BASOPHILS # BLD AUTO: 1.1 % (ref 0–1.8)
BASOPHILS # BLD: 0.07 K/UL (ref 0–0.12)
BILIRUB SERPL-MCNC: 0.4 MG/DL (ref 0.1–1.5)
BUN SERPL-MCNC: 28 MG/DL (ref 8–22)
CALCIUM SERPL-MCNC: 9.3 MG/DL (ref 8.5–10.5)
CHLORIDE SERPL-SCNC: 102 MMOL/L (ref 96–112)
CHOLEST SERPL-MCNC: 253 MG/DL (ref 100–199)
CO2 SERPL-SCNC: 27 MMOL/L (ref 20–33)
CREAT SERPL-MCNC: 0.7 MG/DL (ref 0.5–1.4)
EOSINOPHIL # BLD AUTO: 0.16 K/UL (ref 0–0.51)
EOSINOPHIL NFR BLD: 2.5 % (ref 0–6.9)
ERYTHROCYTE [DISTWIDTH] IN BLOOD BY AUTOMATED COUNT: 45.6 FL (ref 35.9–50)
FASTING STATUS PATIENT QL REPORTED: NORMAL
GFR SERPLBLD CREATININE-BSD FMLA CKD-EPI: 99 ML/MIN/1.73 M 2
GLOBULIN SER CALC-MCNC: 2.5 G/DL (ref 1.9–3.5)
GLUCOSE SERPL-MCNC: 94 MG/DL (ref 65–99)
HCT VFR BLD AUTO: 40.8 % (ref 37–47)
HDLC SERPL-MCNC: 53 MG/DL
HGB BLD-MCNC: 13.1 G/DL (ref 12–16)
IMM GRANULOCYTES # BLD AUTO: 0.02 K/UL (ref 0–0.11)
IMM GRANULOCYTES NFR BLD AUTO: 0.3 % (ref 0–0.9)
LDLC SERPL CALC-MCNC: 169 MG/DL
LYMPHOCYTES # BLD AUTO: 2.91 K/UL (ref 1–4.8)
LYMPHOCYTES NFR BLD: 46.1 % (ref 22–41)
MCH RBC QN AUTO: 31 PG (ref 27–33)
MCHC RBC AUTO-ENTMCNC: 32.1 G/DL (ref 33.6–35)
MCV RBC AUTO: 96.5 FL (ref 81.4–97.8)
MONOCYTES # BLD AUTO: 0.59 K/UL (ref 0–0.85)
MONOCYTES NFR BLD AUTO: 9.4 % (ref 0–13.4)
NEUTROPHILS # BLD AUTO: 2.56 K/UL (ref 2–7.15)
NEUTROPHILS NFR BLD: 40.6 % (ref 44–72)
NRBC # BLD AUTO: 0 K/UL
NRBC BLD-RTO: 0 /100 WBC
PLATELET # BLD AUTO: 393 K/UL (ref 164–446)
PMV BLD AUTO: 11.2 FL (ref 9–12.9)
POTASSIUM SERPL-SCNC: 3.9 MMOL/L (ref 3.6–5.5)
PROT SERPL-MCNC: 6.8 G/DL (ref 6–8.2)
RBC # BLD AUTO: 4.23 M/UL (ref 4.2–5.4)
SODIUM SERPL-SCNC: 138 MMOL/L (ref 135–145)
T3FREE SERPL-MCNC: 3.8 PG/ML (ref 2–4.4)
T4 FREE SERPL-MCNC: 1.2 NG/DL (ref 0.93–1.7)
TRIGL SERPL-MCNC: 155 MG/DL (ref 0–149)
TSH SERPL DL<=0.005 MIU/L-ACNC: 0.07 UIU/ML (ref 0.38–5.33)
WBC # BLD AUTO: 6.3 K/UL (ref 4.8–10.8)

## 2022-07-25 PROCEDURE — 36415 COLL VENOUS BLD VENIPUNCTURE: CPT

## 2022-07-25 PROCEDURE — 85025 COMPLETE CBC W/AUTO DIFF WBC: CPT

## 2022-07-25 PROCEDURE — 86800 THYROGLOBULIN ANTIBODY: CPT

## 2022-07-25 PROCEDURE — 80053 COMPREHEN METABOLIC PANEL: CPT

## 2022-07-25 PROCEDURE — 82306 VITAMIN D 25 HYDROXY: CPT

## 2022-07-25 PROCEDURE — 84443 ASSAY THYROID STIM HORMONE: CPT

## 2022-07-25 PROCEDURE — 80061 LIPID PANEL: CPT

## 2022-07-25 PROCEDURE — 84439 ASSAY OF FREE THYROXINE: CPT

## 2022-07-25 PROCEDURE — 84481 FREE ASSAY (FT-3): CPT

## 2022-07-27 LAB — THYROGLOB AB SERPL-ACNC: <0.9 IU/ML (ref 0–4)

## 2022-10-05 ENCOUNTER — APPOINTMENT (OUTPATIENT)
Dept: RADIOLOGY | Facility: MEDICAL CENTER | Age: 62
End: 2022-10-05
Attending: PHYSICIAN ASSISTANT
Payer: COMMERCIAL

## 2022-10-26 ENCOUNTER — ANESTHESIA EVENT (OUTPATIENT)
Dept: RADIOLOGY | Facility: MEDICAL CENTER | Age: 62
End: 2022-10-26
Payer: COMMERCIAL

## 2022-10-26 ENCOUNTER — HOSPITAL ENCOUNTER (OUTPATIENT)
Dept: RADIOLOGY | Facility: MEDICAL CENTER | Age: 62
End: 2022-10-26
Attending: ORTHOPAEDIC SURGERY
Payer: COMMERCIAL

## 2022-10-26 ENCOUNTER — ANESTHESIA (OUTPATIENT)
Dept: RADIOLOGY | Facility: MEDICAL CENTER | Age: 62
End: 2022-10-26
Payer: COMMERCIAL

## 2022-10-26 VITALS
TEMPERATURE: 98.1 F | OXYGEN SATURATION: 100 % | HEART RATE: 83 BPM | SYSTOLIC BLOOD PRESSURE: 135 MMHG | RESPIRATION RATE: 16 BRPM | WEIGHT: 164.24 LBS | BODY MASS INDEX: 25.78 KG/M2 | HEIGHT: 67 IN | DIASTOLIC BLOOD PRESSURE: 78 MMHG

## 2022-10-26 DIAGNOSIS — M75.121 NONTRAUMATIC COMPLETE TEAR OF RIGHT ROTATOR CUFF: ICD-10-CM

## 2022-10-26 PROCEDURE — 700111 HCHG RX REV CODE 636 W/ 250 OVERRIDE (IP): Performed by: ANESTHESIOLOGY

## 2022-10-26 PROCEDURE — 73221 MRI JOINT UPR EXTREM W/O DYE: CPT | Mod: RT

## 2022-10-26 PROCEDURE — 01922 ANES N-INVAS IMG/RADJ THER: CPT | Performed by: ANESTHESIOLOGY

## 2022-10-26 PROCEDURE — 700105 HCHG RX REV CODE 258: Performed by: ANESTHESIOLOGY

## 2022-10-26 PROCEDURE — 700101 HCHG RX REV CODE 250: Performed by: ANESTHESIOLOGY

## 2022-10-26 RX ORDER — ONDANSETRON 2 MG/ML
4 INJECTION INTRAMUSCULAR; INTRAVENOUS
Status: DISCONTINUED | OUTPATIENT
Start: 2022-10-26 | End: 2022-10-27 | Stop reason: HOSPADM

## 2022-10-26 RX ORDER — DIPHENHYDRAMINE HYDROCHLORIDE 50 MG/ML
12.5 INJECTION INTRAMUSCULAR; INTRAVENOUS
Status: DISCONTINUED | OUTPATIENT
Start: 2022-10-26 | End: 2022-10-27 | Stop reason: HOSPADM

## 2022-10-26 RX ORDER — LIDOCAINE HYDROCHLORIDE 20 MG/ML
INJECTION, SOLUTION EPIDURAL; INFILTRATION; INTRACAUDAL; PERINEURAL PRN
Status: DISCONTINUED | OUTPATIENT
Start: 2022-10-26 | End: 2022-10-26 | Stop reason: SURG

## 2022-10-26 RX ORDER — HALOPERIDOL 5 MG/ML
1 INJECTION INTRAMUSCULAR
Status: DISCONTINUED | OUTPATIENT
Start: 2022-10-26 | End: 2022-10-27 | Stop reason: HOSPADM

## 2022-10-26 RX ORDER — SODIUM CHLORIDE, SODIUM LACTATE, POTASSIUM CHLORIDE, CALCIUM CHLORIDE 600; 310; 30; 20 MG/100ML; MG/100ML; MG/100ML; MG/100ML
INJECTION, SOLUTION INTRAVENOUS CONTINUOUS
Status: DISCONTINUED | OUTPATIENT
Start: 2022-10-26 | End: 2022-10-27 | Stop reason: HOSPADM

## 2022-10-26 RX ORDER — SODIUM CHLORIDE, SODIUM LACTATE, POTASSIUM CHLORIDE, CALCIUM CHLORIDE 600; 310; 30; 20 MG/100ML; MG/100ML; MG/100ML; MG/100ML
INJECTION, SOLUTION INTRAVENOUS
Status: DISCONTINUED | OUTPATIENT
Start: 2022-10-26 | End: 2022-10-26 | Stop reason: SURG

## 2022-10-26 RX ORDER — LEVOTHYROXINE SODIUM 112 UG/1
112 TABLET ORAL
COMMUNITY
End: 2022-12-12

## 2022-10-26 RX ORDER — ONDANSETRON 2 MG/ML
INJECTION INTRAMUSCULAR; INTRAVENOUS PRN
Status: DISCONTINUED | OUTPATIENT
Start: 2022-10-26 | End: 2022-10-26 | Stop reason: SURG

## 2022-10-26 RX ADMIN — EPHEDRINE SULFATE 10 MG: 50 INJECTION INTRAMUSCULAR; INTRAVENOUS; SUBCUTANEOUS at 10:20

## 2022-10-26 RX ADMIN — PROPOFOL 200 MG: 10 INJECTION, EMULSION INTRAVENOUS at 10:00

## 2022-10-26 RX ADMIN — LIDOCAINE HYDROCHLORIDE 80 MG: 20 INJECTION, SOLUTION EPIDURAL; INFILTRATION; INTRACAUDAL at 10:00

## 2022-10-26 RX ADMIN — ONDANSETRON 4 MG: 2 INJECTION INTRAMUSCULAR; INTRAVENOUS at 10:00

## 2022-10-26 RX ADMIN — SODIUM CHLORIDE, POTASSIUM CHLORIDE, SODIUM LACTATE AND CALCIUM CHLORIDE: 600; 310; 30; 20 INJECTION, SOLUTION INTRAVENOUS at 09:56

## 2022-10-26 ASSESSMENT — FIBROSIS 4 INDEX: FIB4 SCORE: 0.88

## 2022-10-26 ASSESSMENT — PAIN SCALES - GENERAL
PAIN_LEVEL: 0
PAINLEVEL: 5=MODERATE PAIN

## 2022-10-26 NOTE — ANESTHESIA POSTPROCEDURE EVALUATION
Patient: Mari Sahni    Procedure Summary     Date: 10/26/22 Room / Location: [address]    Anesthesia Start: 0956 Anesthesia Stop: 1034    Procedure: MR-SHOULDER-W/O Diagnosis:       Nontraumatic complete tear of right rotator cuff      (Eval RTC TEAR)    Scheduled Providers:  Responsible Provider: Jamison Smith M.D.    Anesthesia Type: general ASA Status: 2          Final Anesthesia Type: general  Last vitals  BP   Blood Pressure: (!) 155/88    Temp   37.1 °C (98.8 °F)    Pulse   76   Resp   16    SpO2   100 %      Anesthesia Post Evaluation    Patient location during evaluation: PACU  Patient participation: complete - patient participated  Level of consciousness: awake and alert  Pain score: 0    Airway patency: patent  Anesthetic complications: no  Cardiovascular status: hemodynamically stable  Respiratory status: acceptable  Hydration status: euvolemic    PONV: none          There were no known notable events for this encounter.

## 2022-10-26 NOTE — ANESTHESIA PROCEDURE NOTES
Airway    Date/Time: 10/26/2022 10:00 AM  Performed by: Jamison Smith M.D.  Authorized by: Jamison Smith M.D.     Location:  OR  Urgency:  Elective  Indications for Airway Management:  Anesthesia      Spontaneous Ventilation: absent    Sedation Level:  Deep  Preoxygenated: Yes    Final Airway Type:  Supraglottic airway  Final Supraglottic Airway:  Standard LMA    SGA Size:  4  Number of Attempts at Approach:  1

## 2022-10-26 NOTE — DISCHARGE INSTRUCTIONS
MRI ADULT DISCHARGE INSTRUCTIONS    You have been medicated today for your scan. Please follow the instructions below to ensure your safe recovery. If you have any questions or problems, feel free to call us at [phone] or [phone].     1.   Have someone stay with you to assist you as needed.    2.   Do not drive or operate any mechanical devices.    3.   Do not perform any activity that requires concentration. Make no major decisions over the next 24 hours.     4.   Be careful changing positions from laying down to sitting or standing, as you may become dizzy.     5.   Do not drink alcohol for 48 hours.    6.   There are no restrictions for eating your normal meals. Drink fluids.    7.   You may continue your usual medications for pain, tranquilizers, muscle relaxants or sedatives when awake.     8.   Tomorrow, you may continue your normal daily activities.     9.   Pressure dressing on 10 - 15 minutes. If swelling or bleeding occurs when removed, continue placing direct pressure on injection site for another 5 minutes, or until bleeding stops.     I have been informed of and understand the above discharge instructions.

## 2022-10-26 NOTE — ANESTHESIA POSTPROCEDURE EVALUATION
Patient: Mari Sahni    Procedure Summary     Date: 10/26/22 Room / Location: [address]    Anesthesia Start: 0956 Anesthesia Stop: 1034    Procedure: MR-SHOULDER-W/O Diagnosis:       Nontraumatic complete tear of right rotator cuff      (Eval RTC TEAR)    Scheduled Providers:  Responsible Provider: Jamison Smith M.D.    Anesthesia Type: general ASA Status: 2          Final Anesthesia Type: general  Last vitals  BP   Blood Pressure: (!) 155/88    Temp   37.1 °C (98.8 °F)    Pulse   76   Resp   16    SpO2   100 %      Anesthesia Post Evaluation    Patient location during evaluation: PACU  Patient participation: complete - patient participated  Level of consciousness: awake and alert  Pain score: 0    Airway patency: patent  Anesthetic complications: no  Cardiovascular status: hemodynamically stable  Respiratory status: acceptable  Hydration status: euvolemic    PONV: none          There were no known notable events for this encounter.

## 2022-10-26 NOTE — PROGRESS NOTES
Patient to MRI Outpatient Dept.  Patient informed process and plan of care during this visit.  Anesthesiologist, Dr Smith spoke with patient and discussed provider's plan of care, consent signed. MRI right shoulder without contrast completed.  Patient taken to recovery. Patient tolerated fluids once awake and appropriate.  VSS. DC instructions discussed, all questions answered.  Patient discharged in stable condition with responsible adult.

## 2022-10-26 NOTE — ANESTHESIA PREPROCEDURE EVALUATION
Date/Time: 10/26/22 1015    Procedure: MR-SHOULDER-W/O    Diagnosis: Nontraumatic complete tear of right rotator cuff [M75.121]    Indications: Eval RTC TEAR    Location: [facility] - MRI - [address]          Relevant Problems   No relevant active problems       Physical Exam    Airway   Mallampati: II  TM distance: >3 FB  Neck ROM: full       Cardiovascular - normal exam  Rhythm: regular  Rate: normal  (-) murmur     Dental - normal exam           Pulmonary - normal exam  Breath sounds clear to auscultation     Abdominal    Neurological - normal exam                 Anesthesia Plan    ASA 2       Plan - general       Airway plan will be LMA          Induction: intravenous    Postoperative Plan: Postoperative administration of opioids is intended.    Pertinent diagnostic labs and testing reviewed    Informed Consent:    Anesthetic plan and risks discussed with patient.    Use of blood products discussed with: patient whom consented to blood products.

## 2022-10-26 NOTE — ANESTHESIA TIME REPORT
Anesthesia Start and Stop Event Times     Date Time Event    10/26/2022 0953 Ready for Procedure     0956 Anesthesia Start     1034 Anesthesia Stop        Responsible Staff  10/26/22    Name Role Begin End    Jamison Smith M.D. Anesth 0956 1034        Overtime Reason:  no overtime (within assigned shift)    Comments:

## 2022-10-27 PROBLEM — M75.41 SUBACROMIAL IMPINGEMENT OF RIGHT SHOULDER: Status: ACTIVE | Noted: 2022-10-27

## 2022-10-27 PROBLEM — S46.011A TRAUMATIC TEAR OF RIGHT ROTATOR CUFF, INITIAL ENCOUNTER: Status: ACTIVE | Noted: 2022-10-27

## 2022-10-27 PROBLEM — M75.21 BICEPS TENDONITIS ON RIGHT: Status: ACTIVE | Noted: 2022-10-27

## 2022-10-27 PROBLEM — S43.431A GLENOID LABRAL TEAR, RIGHT, INITIAL ENCOUNTER: Status: ACTIVE | Noted: 2022-10-27

## 2022-10-31 ASSESSMENT — FIBROSIS 4 INDEX: FIB4 SCORE: 0.88

## 2022-11-08 ENCOUNTER — PRE-ADMISSION TESTING (OUTPATIENT)
Dept: ADMISSIONS | Facility: MEDICAL CENTER | Age: 62
End: 2022-11-08
Attending: ORTHOPAEDIC SURGERY
Payer: COMMERCIAL

## 2022-11-08 RX ORDER — LIOTHYRONINE SODIUM 5 UG/1
TABLET ORAL
COMMUNITY
Start: 2022-10-20 | End: 2022-12-12

## 2022-11-08 RX ORDER — IBUPROFEN 800 MG/1
800 TABLET ORAL EVERY 8 HOURS PRN
Status: ON HOLD | COMMUNITY
End: 2022-11-14

## 2022-11-08 RX ORDER — ACETAMINOPHEN 325 MG/1
650 TABLET ORAL EVERY 4 HOURS PRN
COMMUNITY

## 2022-11-08 NOTE — PREPROCEDURE INSTRUCTIONS
"Preadmit Phone appointment: \" Preparing for your Procedure information\" Instructions discussed with Patient.       Patient instructed to continue prescribed medications through the day before surgery, instructed to take the following medications the day of surgery per anesthesia protocol: cytomel, synthroid.         Fasting guidelines discussed with Patient, patient will follow MD's instructions for Pre-Surgery Diet.      All Pt's questions and concerns answered or addressed.  Emailed all instructions.    "

## 2022-11-14 ENCOUNTER — HOSPITAL ENCOUNTER (OUTPATIENT)
Facility: MEDICAL CENTER | Age: 62
End: 2022-11-14
Attending: ORTHOPAEDIC SURGERY | Admitting: ORTHOPAEDIC SURGERY
Payer: COMMERCIAL

## 2022-11-14 ENCOUNTER — ANESTHESIA (OUTPATIENT)
Dept: SURGERY | Facility: MEDICAL CENTER | Age: 62
End: 2022-11-14
Payer: COMMERCIAL

## 2022-11-14 ENCOUNTER — ANESTHESIA EVENT (OUTPATIENT)
Dept: SURGERY | Facility: MEDICAL CENTER | Age: 62
End: 2022-11-14
Payer: COMMERCIAL

## 2022-11-14 VITALS
SYSTOLIC BLOOD PRESSURE: 118 MMHG | BODY MASS INDEX: 25.61 KG/M2 | RESPIRATION RATE: 14 BRPM | WEIGHT: 163.14 LBS | TEMPERATURE: 97.5 F | HEIGHT: 67 IN | HEART RATE: 94 BPM | DIASTOLIC BLOOD PRESSURE: 77 MMHG | OXYGEN SATURATION: 94 %

## 2022-11-14 DIAGNOSIS — M75.41 SUBACROMIAL IMPINGEMENT OF RIGHT SHOULDER: ICD-10-CM

## 2022-11-14 DIAGNOSIS — G89.18 POST-OPERATIVE PAIN: ICD-10-CM

## 2022-11-14 DIAGNOSIS — S46.011A TRAUMATIC TEAR OF RIGHT ROTATOR CUFF, INITIAL ENCOUNTER: ICD-10-CM

## 2022-11-14 DIAGNOSIS — M75.21 BICEPS TENDONITIS ON RIGHT: ICD-10-CM

## 2022-11-14 DIAGNOSIS — S43.431A GLENOID LABRAL TEAR, RIGHT, INITIAL ENCOUNTER: ICD-10-CM

## 2022-11-14 PROBLEM — R11.2 PONV (POSTOPERATIVE NAUSEA AND VOMITING): Status: ACTIVE | Noted: 2022-11-14

## 2022-11-14 PROBLEM — Z98.890 PONV (POSTOPERATIVE NAUSEA AND VOMITING): Status: ACTIVE | Noted: 2022-11-14

## 2022-11-14 PROCEDURE — 29826 SHO ARTHRS SRG DECOMPRESSION: CPT | Performed by: ORTHOPAEDIC SURGERY

## 2022-11-14 PROCEDURE — 64415 NJX AA&/STRD BRCH PLXS IMG: CPT | Mod: 59,RT | Performed by: ANESTHESIOLOGY

## 2022-11-14 PROCEDURE — 160046 HCHG PACU - 1ST 60 MINS PHASE II: Performed by: ORTHOPAEDIC SURGERY

## 2022-11-14 PROCEDURE — 23430 REPAIR BICEPS TENDON: CPT | Mod: ASROC | Performed by: PHYSICIAN ASSISTANT

## 2022-11-14 PROCEDURE — 160029 HCHG SURGERY MINUTES - 1ST 30 MINS LEVEL 4: Performed by: ORTHOPAEDIC SURGERY

## 2022-11-14 PROCEDURE — 23430 REPAIR BICEPS TENDON: CPT | Performed by: ORTHOPAEDIC SURGERY

## 2022-11-14 PROCEDURE — 160041 HCHG SURGERY MINUTES - EA ADDL 1 MIN LEVEL 4: Performed by: ORTHOPAEDIC SURGERY

## 2022-11-14 PROCEDURE — 502000 HCHG MISC OR IMPLANTS RC 0278: Performed by: ORTHOPAEDIC SURGERY

## 2022-11-14 PROCEDURE — 76942 ECHO GUIDE FOR BIOPSY: CPT | Mod: 26 | Performed by: ANESTHESIOLOGY

## 2022-11-14 PROCEDURE — 700102 HCHG RX REV CODE 250 W/ 637 OVERRIDE(OP): Performed by: ANESTHESIOLOGY

## 2022-11-14 PROCEDURE — 160035 HCHG PACU - 1ST 60 MINS PHASE I: Performed by: ORTHOPAEDIC SURGERY

## 2022-11-14 PROCEDURE — 29827 SHO ARTHRS SRG RT8TR CUF RPR: CPT | Mod: ASROC,RT | Performed by: PHYSICIAN ASSISTANT

## 2022-11-14 PROCEDURE — 700105 HCHG RX REV CODE 258: Performed by: ANESTHESIOLOGY

## 2022-11-14 PROCEDURE — 160002 HCHG RECOVERY MINUTES (STAT): Performed by: ORTHOPAEDIC SURGERY

## 2022-11-14 PROCEDURE — C1713 ANCHOR/SCREW BN/BN,TIS/BN: HCPCS | Performed by: ORTHOPAEDIC SURGERY

## 2022-11-14 PROCEDURE — 700111 HCHG RX REV CODE 636 W/ 250 OVERRIDE (IP): Performed by: ANESTHESIOLOGY

## 2022-11-14 PROCEDURE — 700101 HCHG RX REV CODE 250: Performed by: ANESTHESIOLOGY

## 2022-11-14 PROCEDURE — 160036 HCHG PACU - EA ADDL 30 MINS PHASE I: Performed by: ORTHOPAEDIC SURGERY

## 2022-11-14 PROCEDURE — 01630 ANES OPN/ARTHR PX SHO JT NOS: CPT | Performed by: ANESTHESIOLOGY

## 2022-11-14 PROCEDURE — 160048 HCHG OR STATISTICAL LEVEL 1-5: Performed by: ORTHOPAEDIC SURGERY

## 2022-11-14 PROCEDURE — 29826 SHO ARTHRS SRG DECOMPRESSION: CPT | Mod: ASROC | Performed by: PHYSICIAN ASSISTANT

## 2022-11-14 PROCEDURE — 29823 SHO ARTHRS SRG XTNSV DBRDMT: CPT | Mod: 59 | Performed by: ORTHOPAEDIC SURGERY

## 2022-11-14 PROCEDURE — 160009 HCHG ANES TIME/MIN: Performed by: ORTHOPAEDIC SURGERY

## 2022-11-14 PROCEDURE — 29827 SHO ARTHRS SRG RT8TR CUF RPR: CPT | Mod: RT | Performed by: ORTHOPAEDIC SURGERY

## 2022-11-14 PROCEDURE — 160025 RECOVERY II MINUTES (STATS): Performed by: ORTHOPAEDIC SURGERY

## 2022-11-14 PROCEDURE — 700111 HCHG RX REV CODE 636 W/ 250 OVERRIDE (IP): Performed by: ORTHOPAEDIC SURGERY

## 2022-11-14 PROCEDURE — A9270 NON-COVERED ITEM OR SERVICE: HCPCS | Performed by: ANESTHESIOLOGY

## 2022-11-14 PROCEDURE — 700101 HCHG RX REV CODE 250: Performed by: ORTHOPAEDIC SURGERY

## 2022-11-14 PROCEDURE — 29823 SHO ARTHRS SRG XTNSV DBRDMT: CPT | Mod: ASROC,59 | Performed by: PHYSICIAN ASSISTANT

## 2022-11-14 PROCEDURE — 64415 NJX AA&/STRD BRCH PLXS IMG: CPT | Performed by: ORTHOPAEDIC SURGERY

## 2022-11-14 DEVICE — ANCHOR SUTURE ICONIX 3 WITH 3 STRANDS #2 FORCE FIBER 2.3MM (5EA/BX): Type: IMPLANTABLE DEVICE | Site: SHOULDER | Status: FUNCTIONAL

## 2022-11-14 DEVICE — IMPLANTABLE DEVICE: Type: IMPLANTABLE DEVICE | Site: SHOULDER | Status: FUNCTIONAL

## 2022-11-14 DEVICE — ANCHOR SUTURE ICONIX 2 WITH #2 FORCE FIBER 2.3MM (5EA/BX): Type: IMPLANTABLE DEVICE | Site: SHOULDER | Status: FUNCTIONAL

## 2022-11-14 RX ORDER — SCOLOPAMINE TRANSDERMAL SYSTEM 1 MG/1
1 PATCH, EXTENDED RELEASE TRANSDERMAL ONCE
Status: DISCONTINUED | OUTPATIENT
Start: 2022-11-14 | End: 2022-11-14 | Stop reason: HOSPADM

## 2022-11-14 RX ORDER — ACETAMINOPHEN 500 MG
1000 TABLET ORAL ONCE
Status: DISCONTINUED | OUTPATIENT
Start: 2022-11-14 | End: 2022-11-14 | Stop reason: HOSPADM

## 2022-11-14 RX ORDER — METOPROLOL TARTRATE 1 MG/ML
1 INJECTION, SOLUTION INTRAVENOUS
Status: DISCONTINUED | OUTPATIENT
Start: 2022-11-14 | End: 2022-11-14 | Stop reason: HOSPADM

## 2022-11-14 RX ORDER — HYDROMORPHONE HYDROCHLORIDE 2 MG/ML
0.2 INJECTION, SOLUTION INTRAMUSCULAR; INTRAVENOUS; SUBCUTANEOUS
Status: DISCONTINUED | OUTPATIENT
Start: 2022-11-14 | End: 2022-11-14 | Stop reason: HOSPADM

## 2022-11-14 RX ORDER — BUPIVACAINE HYDROCHLORIDE AND EPINEPHRINE 2.5; 5 MG/ML; UG/ML
INJECTION, SOLUTION EPIDURAL; INFILTRATION; INTRACAUDAL; PERINEURAL
Status: DISCONTINUED | OUTPATIENT
Start: 2022-11-14 | End: 2022-11-14 | Stop reason: HOSPADM

## 2022-11-14 RX ORDER — CEFAZOLIN SODIUM 1 G/3ML
2 INJECTION, POWDER, FOR SOLUTION INTRAMUSCULAR; INTRAVENOUS ONCE
Status: DISCONTINUED | OUTPATIENT
Start: 2022-11-14 | End: 2022-11-14 | Stop reason: HOSPADM

## 2022-11-14 RX ORDER — MIDAZOLAM HYDROCHLORIDE 1 MG/ML
INJECTION INTRAMUSCULAR; INTRAVENOUS PRN
Status: DISCONTINUED | OUTPATIENT
Start: 2022-11-14 | End: 2022-11-14 | Stop reason: SURG

## 2022-11-14 RX ORDER — BUPIVACAINE HYDROCHLORIDE 2.5 MG/ML
INJECTION, SOLUTION EPIDURAL; INFILTRATION; INTRACAUDAL PRN
Status: DISCONTINUED | OUTPATIENT
Start: 2022-11-14 | End: 2022-11-14 | Stop reason: SURG

## 2022-11-14 RX ORDER — ONDANSETRON 4 MG/1
4 TABLET, FILM COATED ORAL EVERY 4 HOURS PRN
Qty: 20 TABLET | Refills: 0 | Status: SHIPPED | OUTPATIENT
Start: 2022-11-14 | End: 2022-12-12

## 2022-11-14 RX ORDER — ROCURONIUM BROMIDE 10 MG/ML
INJECTION, SOLUTION INTRAVENOUS PRN
Status: DISCONTINUED | OUTPATIENT
Start: 2022-11-14 | End: 2022-11-14 | Stop reason: SURG

## 2022-11-14 RX ORDER — HYDROMORPHONE HYDROCHLORIDE 2 MG/ML
0.4 INJECTION, SOLUTION INTRAMUSCULAR; INTRAVENOUS; SUBCUTANEOUS
Status: DISCONTINUED | OUTPATIENT
Start: 2022-11-14 | End: 2022-11-14 | Stop reason: HOSPADM

## 2022-11-14 RX ORDER — LABETALOL HYDROCHLORIDE 5 MG/ML
5 INJECTION, SOLUTION INTRAVENOUS
Status: DISCONTINUED | OUTPATIENT
Start: 2022-11-14 | End: 2022-11-14 | Stop reason: HOSPADM

## 2022-11-14 RX ORDER — HYDRALAZINE HYDROCHLORIDE 20 MG/ML
5 INJECTION INTRAMUSCULAR; INTRAVENOUS
Status: DISCONTINUED | OUTPATIENT
Start: 2022-11-14 | End: 2022-11-14 | Stop reason: HOSPADM

## 2022-11-14 RX ORDER — SODIUM CHLORIDE, SODIUM LACTATE, POTASSIUM CHLORIDE, CALCIUM CHLORIDE 600; 310; 30; 20 MG/100ML; MG/100ML; MG/100ML; MG/100ML
INJECTION, SOLUTION INTRAVENOUS
Status: DISCONTINUED | OUTPATIENT
Start: 2022-11-14 | End: 2022-11-14 | Stop reason: SURG

## 2022-11-14 RX ORDER — EPINEPHRINE 0.1 MG/ML
SYRINGE (ML) INJECTION
Status: DISCONTINUED | OUTPATIENT
Start: 2022-11-14 | End: 2022-11-14 | Stop reason: HOSPADM

## 2022-11-14 RX ORDER — OXYCODONE HYDROCHLORIDE 5 MG/1
5-10 TABLET ORAL EVERY 6 HOURS PRN
Qty: 40 TABLET | Refills: 0 | Status: SHIPPED | OUTPATIENT
Start: 2022-11-14 | End: 2022-11-21

## 2022-11-14 RX ORDER — CELECOXIB 200 MG/1
400 CAPSULE ORAL ONCE
Status: DISCONTINUED | OUTPATIENT
Start: 2022-11-14 | End: 2022-11-14 | Stop reason: HOSPADM

## 2022-11-14 RX ORDER — PHENYLEPHRINE HCL IN 0.9% NACL 0.5 MG/5ML
SYRINGE (ML) INTRAVENOUS PRN
Status: DISCONTINUED | OUTPATIENT
Start: 2022-11-14 | End: 2022-11-14 | Stop reason: SURG

## 2022-11-14 RX ORDER — HALOPERIDOL 5 MG/ML
1 INJECTION INTRAMUSCULAR
Status: DISCONTINUED | OUTPATIENT
Start: 2022-11-14 | End: 2022-11-14 | Stop reason: HOSPADM

## 2022-11-14 RX ORDER — DIPHENHYDRAMINE HYDROCHLORIDE 50 MG/ML
12.5 INJECTION INTRAMUSCULAR; INTRAVENOUS
Status: DISCONTINUED | OUTPATIENT
Start: 2022-11-14 | End: 2022-11-14 | Stop reason: HOSPADM

## 2022-11-14 RX ORDER — ONDANSETRON 2 MG/ML
INJECTION INTRAMUSCULAR; INTRAVENOUS PRN
Status: DISCONTINUED | OUTPATIENT
Start: 2022-11-14 | End: 2022-11-14 | Stop reason: SURG

## 2022-11-14 RX ORDER — SODIUM CHLORIDE, SODIUM LACTATE, POTASSIUM CHLORIDE, CALCIUM CHLORIDE 600; 310; 30; 20 MG/100ML; MG/100ML; MG/100ML; MG/100ML
INJECTION, SOLUTION INTRAVENOUS CONTINUOUS
Status: DISCONTINUED | OUTPATIENT
Start: 2022-11-14 | End: 2022-11-14 | Stop reason: HOSPADM

## 2022-11-14 RX ORDER — OXYCODONE HCL 5 MG/5 ML
10 SOLUTION, ORAL ORAL
Status: COMPLETED | OUTPATIENT
Start: 2022-11-14 | End: 2022-11-14

## 2022-11-14 RX ORDER — DEXAMETHASONE SODIUM PHOSPHATE 4 MG/ML
INJECTION, SOLUTION INTRA-ARTICULAR; INTRALESIONAL; INTRAMUSCULAR; INTRAVENOUS; SOFT TISSUE PRN
Status: DISCONTINUED | OUTPATIENT
Start: 2022-11-14 | End: 2022-11-14 | Stop reason: SURG

## 2022-11-14 RX ORDER — MEPERIDINE HYDROCHLORIDE 50 MG/ML
12.5 INJECTION INTRAMUSCULAR; INTRAVENOUS; SUBCUTANEOUS
Status: DISCONTINUED | OUTPATIENT
Start: 2022-11-14 | End: 2022-11-14 | Stop reason: HOSPADM

## 2022-11-14 RX ORDER — MIDAZOLAM HYDROCHLORIDE 1 MG/ML
1 INJECTION INTRAMUSCULAR; INTRAVENOUS
Status: DISCONTINUED | OUTPATIENT
Start: 2022-11-14 | End: 2022-11-14 | Stop reason: HOSPADM

## 2022-11-14 RX ORDER — OXYCODONE HCL 5 MG/5 ML
5 SOLUTION, ORAL ORAL
Status: COMPLETED | OUTPATIENT
Start: 2022-11-14 | End: 2022-11-14

## 2022-11-14 RX ORDER — CEFAZOLIN SODIUM 1 G/3ML
INJECTION, POWDER, FOR SOLUTION INTRAMUSCULAR; INTRAVENOUS PRN
Status: DISCONTINUED | OUTPATIENT
Start: 2022-11-14 | End: 2022-11-14 | Stop reason: HOSPADM

## 2022-11-14 RX ORDER — HYDROMORPHONE HYDROCHLORIDE 2 MG/ML
0.1 INJECTION, SOLUTION INTRAMUSCULAR; INTRAVENOUS; SUBCUTANEOUS
Status: DISCONTINUED | OUTPATIENT
Start: 2022-11-14 | End: 2022-11-14 | Stop reason: HOSPADM

## 2022-11-14 RX ORDER — NAPROXEN 500 MG/1
500 TABLET ORAL 2 TIMES DAILY WITH MEALS
Qty: 10 TABLET | Refills: 0 | Status: SHIPPED | OUTPATIENT
Start: 2022-11-14 | End: 2022-11-19

## 2022-11-14 RX ORDER — LIDOCAINE HYDROCHLORIDE 20 MG/ML
INJECTION, SOLUTION EPIDURAL; INFILTRATION; INTRACAUDAL; PERINEURAL PRN
Status: DISCONTINUED | OUTPATIENT
Start: 2022-11-14 | End: 2022-11-14 | Stop reason: SURG

## 2022-11-14 RX ORDER — ONDANSETRON 2 MG/ML
4 INJECTION INTRAMUSCULAR; INTRAVENOUS
Status: DISCONTINUED | OUTPATIENT
Start: 2022-11-14 | End: 2022-11-14 | Stop reason: HOSPADM

## 2022-11-14 RX ADMIN — FENTANYL CITRATE 50 MCG: 50 INJECTION, SOLUTION INTRAMUSCULAR; INTRAVENOUS at 11:02

## 2022-11-14 RX ADMIN — MIDAZOLAM HYDROCHLORIDE 2 MG: 1 INJECTION, SOLUTION INTRAMUSCULAR; INTRAVENOUS at 11:02

## 2022-11-14 RX ADMIN — DEXAMETHASONE SODIUM PHOSPHATE 1.3 MG: 4 INJECTION, SOLUTION INTRA-ARTICULAR; INTRALESIONAL; INTRAMUSCULAR; INTRAVENOUS; SOFT TISSUE at 11:08

## 2022-11-14 RX ADMIN — PROPOFOL 150 MG: 10 INJECTION, EMULSION INTRAVENOUS at 11:19

## 2022-11-14 RX ADMIN — FENTANYL CITRATE 50 MCG: 50 INJECTION, SOLUTION INTRAMUSCULAR; INTRAVENOUS at 11:28

## 2022-11-14 RX ADMIN — SUGAMMADEX 200 MG: 100 INJECTION, SOLUTION INTRAVENOUS at 13:32

## 2022-11-14 RX ADMIN — BUPIVACAINE HYDROCHLORIDE 20 ML: 2.5 INJECTION, SOLUTION EPIDURAL; INFILTRATION; INTRACAUDAL; PERINEURAL at 11:08

## 2022-11-14 RX ADMIN — PROPOFOL 50 MG: 10 INJECTION, EMULSION INTRAVENOUS at 12:05

## 2022-11-14 RX ADMIN — SODIUM CHLORIDE, POTASSIUM CHLORIDE, SODIUM LACTATE AND CALCIUM CHLORIDE: 600; 310; 30; 20 INJECTION, SOLUTION INTRAVENOUS at 12:56

## 2022-11-14 RX ADMIN — OXYCODONE HYDROCHLORIDE 10 MG: 5 SOLUTION ORAL at 14:01

## 2022-11-14 RX ADMIN — FENTANYL CITRATE 100 MCG: 50 INJECTION, SOLUTION INTRAMUSCULAR; INTRAVENOUS at 13:14

## 2022-11-14 RX ADMIN — MIDAZOLAM 1 MG: 1 INJECTION, SOLUTION INTRAMUSCULAR; INTRAVENOUS at 14:30

## 2022-11-14 RX ADMIN — FENTANYL CITRATE 100 MCG: 50 INJECTION, SOLUTION INTRAMUSCULAR; INTRAVENOUS at 12:05

## 2022-11-14 RX ADMIN — DEXAMETHASONE SODIUM PHOSPHATE 4 MG: 4 INJECTION, SOLUTION INTRA-ARTICULAR; INTRALESIONAL; INTRAMUSCULAR; INTRAVENOUS; SOFT TISSUE at 11:19

## 2022-11-14 RX ADMIN — Medication 100 MCG: at 11:38

## 2022-11-14 RX ADMIN — ROCURONIUM BROMIDE 30 MG: 10 INJECTION, SOLUTION INTRAVENOUS at 12:05

## 2022-11-14 RX ADMIN — Medication 100 MCG: at 11:51

## 2022-11-14 RX ADMIN — ROCURONIUM BROMIDE 50 MG: 10 INJECTION, SOLUTION INTRAVENOUS at 11:19

## 2022-11-14 RX ADMIN — LIDOCAINE HYDROCHLORIDE 100 MG: 20 INJECTION, SOLUTION EPIDURAL; INFILTRATION; INTRACAUDAL at 11:19

## 2022-11-14 RX ADMIN — SODIUM CHLORIDE, POTASSIUM CHLORIDE, SODIUM LACTATE AND CALCIUM CHLORIDE: 600; 310; 30; 20 INJECTION, SOLUTION INTRAVENOUS at 11:14

## 2022-11-14 RX ADMIN — ONDANSETRON 4 MG: 2 INJECTION INTRAMUSCULAR; INTRAVENOUS at 13:25

## 2022-11-14 RX ADMIN — FENTANYL CITRATE 50 MCG: 50 INJECTION, SOLUTION INTRAMUSCULAR; INTRAVENOUS at 14:01

## 2022-11-14 RX ADMIN — CEFAZOLIN 2 G: 330 INJECTION, POWDER, FOR SOLUTION INTRAMUSCULAR; INTRAVENOUS at 11:19

## 2022-11-14 ASSESSMENT — FIBROSIS 4 INDEX: FIB4 SCORE: 0.88

## 2022-11-14 ASSESSMENT — PAIN DESCRIPTION - PAIN TYPE: TYPE: CHRONIC PAIN

## 2022-11-14 ASSESSMENT — PAIN SCALES - GENERAL: PAIN_LEVEL: 4

## 2022-11-14 NOTE — OP REPORT
DATE OF SERVICE: November 14, 2022     PREOPERATIVE DIAGNOSES:  1.  Right shoulder rotator cuff tear.  2.  Right shoulder long head of the biceps tendon tear.  3.  Right shoulder labral tearing.     POSTOPERATIVE DIAGNOSES:  1.  Right shoulder rotator cuff tear of the supraspinatus and subscapularis.  2.  Right shoulder partial tear of the long head of the biceps tendon.  3.  Right shoulder subacromial impingement   4.  Right shoulder extensive glenohumeral synovitis, tearing of the anterior, superior, and inferior labrum, tearing of the biceps labral anchor, extensive subacromial bursitis, and grade I-II glenohumeral chondromalacia.     PROCEDURE PERFORMED:  1.  Right shoulder diagnostic arthroscopy.  2.  Right shoulder arthroscopic rotator cuff repair of the supraspinatus and subscapularis.  3.  Right shoulder open subpectoral biceps tenodesis  4.  Right shoulder subacromial decompression.  5.  Right shoulder extensive debridement of glenohumeral synovitis, debridement of the anterior, inferior, and superior labrum, debridement of the biceps labral anchor, subacromial bursectomy, and glenohumeral chondroplasty     ATTENDING SURGEON:  Oxana Medel MD     ASSISTANT: Zak Howard PA-C     ANESTHESIA:  General with an interscalene nerve block.     ANESTHESIOLOGIST:  Saúl Gtz MD     SPECIMENS:  None.     ESTIMATED BLOOD LOSS: 10 mL     FINDINGS:  There was a massive rotator cuff tear of the supraspinatus and subscapularis with retraction of the tendon to the level of the glenoid.  There was a partial tear of the long head of the biceps tendon, labral tearing, biceps labral anchor tearing, and grade I-II glenohumeral chondromalacia.  There was extensive subacromial impingement and bursitis.       COMPLICATIONS:  None.     IMPLANTS:    [place] iconix 2 anchor x1 for biceps tenodesis  Ashlie 4.75 mm triple loaded alphavent anchors x3 for rotator cuff repair.     INDICATIONS:  The patient is a very nice 62  year-old right hand dominant female who injured her right shoulder when she fell and landed hard onto her right arm.  The patient had significant weakness after falling as well as pain.  An MRI revealed a large rotator cuff tear.  Given the patient's continued pain and dysfunction and failure of non operative management, the patient was indicated for surgery.  I had a long discussion with the patient regarding the risks and benefits of surgery including but not limited to bleeding, infection, risk to surrounding structures such as blood vessels and nerves, pain, scar, reoperation, hardware failure, risk with anesthesia such as stroke, heart attack, deep venous thrombosis, pulmonary embolism and death.  The patient understood the risks and benefits and elected to proceed with surgery.     PROCEDURE IN DETAIL:  The patient was met in the preoperative hold area where the correct right upper extremity was marked with my initials. The patient then underwent an interscalene nerve block under ultrasound guidance by the anesthesia team. The patient was transferred to the operating room where she was placed supine on the operating room table with all bony prominences well padded.  The patient received 2 g of preoperative Ancef. The patient was intubated by the anesthesia team without complications. The patient was then placed in the beach chair position again with all bony prominences well padded.  Preoperative exam under anesthesia revealed full range of motion of the right shoulder with forward flexion to 180 degrees, abduction to 180 degrees, external rotation to 45 degrees.  The patient's right upper extremity was then prepped and draped in the usual sterile fashion.  A preoperative timeout was held where all those in the room agreed upon the correct patient, the correct site of surgery and the correct surgery to be performed.     I began the procedure by injecting 30 mL of 0.25% Marcaine with epinephrine into the portal  sites and into the axillary incision for the biceps tenodesis.  I then used an #11 blade to make my posterior portal and inserted the scope into the glenohumeral joint.  Upon entering the glenohumeral joint, there was significant erythema as well as very large rotator cuff tear that was immediately apparent from the glenohumeral joint.  I then made my anterior portal under direct visualization using a spinal needle.   The patient had tearing at the biceps anchor on the labrum as well as a partial tear of the long head of the biceps tendon.  I used the arthroscopic biter to perform my biceps tenotomy.  I used the 4.0 mm sucker shaver to debride the biceps tendon stump. There was tearing of the labrum anteriorly, inferiorly, and superiorly as well as extensive glenohumeral synovitis.  I then used the 4.0mm sucker shaver to perform an extensive labral debridement and to debride the glenohumeral synovitis.  There were no loose bodies in the axillary recess.  The subscapularis was completely torn and retracted back to the level of the glenoid.  At this point, I elected to proceed with a subscapularis repair.  I used the 4.0 mm sucker shaver and rasp to debride the lesser tuberosity to get down to nice good bleeding bone.  I then placed one Ashlie iconix 3 anchor, but unfortunately the bone was quite weak and this anchor pulled out.  I then elected to place one [place] 4.75 mm triple loaded alphavent anchor and this had much better fixation within the bone.  The sutures were then passed in a simple fashion from inferior to superior and this reduced the subscapularis back nicely to the lesser tuberosity with minimal tension on the repair.  There was grade I-II chondromalacia of both the humeral head and the glenoid. The 4.0mm sucker shaver was used to perform a glenohumeral chondroplasty.  The scope was then removed from the shoulder and I turned my attention towards the subpectoral biceps tenodesis.     I made a 3 cm  incision in the axillary fold for my subpectoral biceps tenodesis.  I dissected down through skin and subcutaneous tissues using Bovie electrocautery for hemostasis.  I identified my inferior border of the pectoralis and this was retracted superiorly.  Hohmann retractor was placed laterally on the lateral aspect of the humerus and I identified my bicipital groove.  A right angle snap was used to retrieve my biceps tendon within the bicipital groove and the synovium was cleared off for my tenodesis.  I then used a [place] iconix 2 suture anchor unicortically within the bicipital groove and then used a free needle to pass the sutures through my biceps tendon for tenodesis.  The proximal segment of the tendon was removed and passed off and the tendon was tied down to the anchor without complications.   Next, I copiously irrigated out the subpectoral incision with normal saline and performed a layered closure using 2-0 Monocryl followed by a running 3-0 Monocryl.       The scope was then inserted into the subacromial space and I established my lateral portal under direct visualization with a spinal needle.  There was thickened and inflamed subacromial bursa, so the 4.0mm sucker shaver and SERFAS ablater were used to perform a subacromial bursectomy.  There was a large anterior osteophyte on the undersurface of the acromion causing impingement.  I then performed a subacromial decompression using the 5.5 mm resector and SERFAS ablater turning her type II acromion into a type I acromion.  Visualizing the rotator cuff from the bursal side again showed a large, full thickness tear of the entire supraspinatus.  I then used the 4.0 mm sucker shaver to bur the bone at the greater tuberosity to allow for bleeding after I placed my anchors.  I then placed two [place] 4.75 mm triple loaded alphavent suture anchors percutaneously on the greater tuberosity.  The anchors were inserted under direct visualization, so they did not  penetrate the humeral head.  I then used a suture passer to pass my sutures from anterior to posterior and these were tied in a simple fashion.  This reduced the tendon back down to the greater tuberosity well with no significant tension on the tendon.  I was happy with the repair and the anchors had good fixation within the bone.  I then copiously irrigated out the shoulder with arthroscopic fluid and the scope was removed from the shoulder.    The wounds were copiously irrigated and a 3-0 Prolene was used to suture the portals and a sterile dressing was applied.  The patient's operative extremity was placed in a shoulder abduction sling and awoken from anesthesia without complications.  Please note that all counts were correct at the end of the case and Zak Howard PA-C, was necessary and critical for all portions of the procedure including retraction, suture management, visualization, and positioning and without their help, the surgery would not have been as technically successful.        Oxana Medel MD

## 2022-11-14 NOTE — H&P
Surgery Orthopedic History & Physical Note    Date  11/14/2022    Primary Care Physician  JOJO Schuster      Pre-Op Diagnosis Codes:     * Traumatic tear of right rotator cuff, initial encounter [S46.011A]     * Biceps tendonitis on right [M75.21]     * Glenoid labral tear, right, initial encounter [S43.431A]     * Subacromial impingement of right shoulder [M75.41]    HPI  This is a 62 y.o. female who presented with  weeks of worsening right shoulder pain and inability to lift up her right arm.  Suzette was in [place] filling out paperwork after her dad passed away when she was walking upstairs and got her sandal stuck on a step and then fell and landed hard onto her right arm and right knee.  She had severe right shoulder pain after this fall and was unable to lift up her arm.  She was then seen at [facility] where an MRI was ordered, but she was told that she moved too much during the MRI so it was not able to be completed.  Arazna is also having worsening neck spasms for which she sees Dr. Blackman for.  Since her fall, she has been unable to lift her arm above her waist and has severe pain especially at night.  She points to the anterior lateral aspect of the right shoulder is the most severe area of pain.  She denies any numbness or tingling distally.    Past Medical History:   Diagnosis Date    Pain     neck, shoulder    PONV (postoperative nausea and vomiting)        Past Surgical History:   Procedure Laterality Date    OTHER ORTHOPEDIC SURGERY      back pain x 6 months    PA BREAST AUGMENTATION WITH IMPLANT         Current Facility-Administered Medications   Medication Dose Route Frequency Provider Last Rate Last Admin    acetaminophen (TYLENOL) tablet 1,000 mg  1,000 mg Oral Once John Gtz M.D.        celecoxib (CELEBREX) capsule 400 mg  400 mg Oral Once John Gtz M.D.        scopolamine (TRANSDERM-SCOP) patch 1 Patch  1 Patch Transdermal Once John Gtz M.D.        ceFAZolin  (ANCEF) injection 2 g  2 g Intravenous Once Zak Howard P.A.-C.        lidocaine (XYLOCAINE) 1 % injection 0.5 mL  0.5 mL Intradermal Once PRN Oxana Medel M.D.           Social History     Socioeconomic History    Marital status:      Spouse name: Not on file    Number of children: Not on file    Years of education: Not on file    Highest education level: Not on file   Occupational History    Not on file   Tobacco Use    Smoking status: Former     Packs/day: 0.75     Years: 15.00     Pack years: 11.25     Types: Cigarettes     Quit date:      Years since quittin.8    Smokeless tobacco: Never    Tobacco comments:     QUIT 20+ YEARS AGO - PER PT, 2022   Vaping Use    Vaping Use: Never used   Substance and Sexual Activity    Alcohol use: Yes     Comment: 3-4X WEEKLY    Drug use: Not on file     Comment: cbd    Sexual activity: Not on file   Other Topics Concern    Not on file   Social History Narrative    Not on file     Social Determinants of Health     Financial Resource Strain: Not on file   Food Insecurity: Not on file   Transportation Needs: Not on file   Physical Activity: Not on file   Stress: Not on file   Social Connections: Not on file   Intimate Partner Violence: Not on file   Housing Stability: Not on file       Family History   Problem Relation Age of Onset    Heart Disease Mother         Angioplasty 40s    Hyperlipidemia Mother     Diabetes Mother     Hypertension Mother     Arthritis Father     Other Father         Alive at 82, has Parkinsons    Hyperlipidemia Father     Hypertension Sister     Hyperlipidemia Sister     Heart Attack Paternal Grandfather          of MI in 60s, CABG, had PM       Allergies  Codeine and Pcn [penicillins]    Review of Systems  Negative except for right shoulder pain    Physical Exam    Vital Signs  Blood Pressure: 135/82   Temperature: 37.1 °C (98.7 °F)   Pulse: 90   Respiration: 16   Pulse Oximetry: 97 %       GENERAL: A+Ox3. INAD.  Well appearing and well groomed.  MENTAL STATUS: Pleasant and cooperative. Alert and oriented x3 with normal mood and affect.  Vascular: Pulses are palpable 2+, capillary refills to digits are normal.  Skin: No wounds/lesions/ulcers. Skin warm & dry.  Respiratory: No respiratory distress     MUSCULOSKELETAL:    Right UPPER EXTREMITY:   Normal posture.  Shoulder: No swelling.  Active Range of Motion: Forward Flexion 60, Abduction 60, ER 45, IR to her back pocket with severe pain throughout motion  Passive is symmetric to active.  Strength: 4/5 Supraspinatus, 4/5 External Rotators, 3/5 Subscapularis  Biceps: Very tender to palpation, positive [place]'s, positive speeds  Tenderness: Tender over the anterior and lateral shoulder, nontender over the AC joint  Impingement Signs: Positive Leos, positive Neer.  Positive belly press, positive empty can  Stability: Normal.  Sensation intact to light touch in the bilateral upper extremities.  2+ radial pulses.     ==========     IMAGING FINDINGS:      I personally reviewed the images independent of the radiology read.     4 view radiographs of the right shoulder were reviewed by me today which demonstrate no acute osseous abnormality.  She has no evidence of glenohumeral arthritis or high riding humerus.       This MRI was conducted on 10/26/2022.  These images demonstrate a complete full-thickness with tear of the entire subscapularis tendon with retraction beyond the glenoid.  There is displacement of the biceps tendon medially outside the bicipital groove due to overlying subscapularis tear.  There appears to be a full-thickness tear of the anterior supraspinatus tendon.      ==========     ASSESSMENT & PLAN: Aranza is a very nice 62-year-old female now 3 weeks status post a fall onto her right shoulder with inability to lift up her arm with imaging and exam today concerning for a diagnosis of an acute rotator cuff tear.     -I discussed these findings at length with  Aranza today.  We discussed multiple treatment options.  Given the acuity of her injury, significant pain, and extreme difficulty with range of motion, and activity level we did discuss surgery today.  I informed her that her best surgical option would be a right shoulder arthroscopy with a rotator cuff repair including the subscapularis tendon, biceps tenodesis, subacromial decompression, and other repairs as indicated.  We discussed the possibility that the subscapularis tendon may not be repairable given the significant retraction.  I discussed the rehabilitation and recovery process at length with Suzette today as well.     It was explained to the patient that any surgical procedure carries with it the risks of loss of limb or loss of life. Medical complications include but are not limited to death or disability from a heart attack, stroke, GI bleeding, thrombophlebitis and pulmonary embolism, sepsis, adverse reactions (death) due to blood transfusions, allergy or adverse drug reaction. There are other rare, unknown and uncommon systemic conditions that could also adversely affect the systemic outcome. Local complications include but are not limited to wound dehiscence, deep infection, failure of fixation or reconstruction, damage to nerves and vessels which could be temporary or permanent, local recurrence as well as other rare, uncommon and unknown local complications that may necessitate re-operation, more complex orthopaedic reconstructions or amputation. The Patient was informed, questions were answered, and the consents were signed.  They understood the procedure and despite the risks decided to proceed with surgery.    -Patient was educated on clinical and imaging findings.  -Patient verbalizes understanding of all discussions today, and all questions were answered satisfactorily.    Zak Howard PA-C

## 2022-11-14 NOTE — OR NURSING
1345: Patient arrived to PACU from OR via gurney. Report received from anesthesia and RN. Respirations are spontaneous and unlabored. VSS on 6L. Dressing is CDI. Cold pack applied. RUE: radial pulse 2+, cap refill less than 3 seconds, warm, pink.     1401: c/o 8/10 right shoulder pain. See MAR, PO analgesia given.    1430: c/o anxiety, see MAR    1500: pt meets criteria for phase II. Report called to receiving RN

## 2022-11-14 NOTE — ANESTHESIA PROCEDURE NOTES
Airway    Date/Time: 11/14/2022 11:22 AM  Performed by: John Gtz M.D.  Authorized by: John Gtz M.D.     Location:  OR  Urgency:  Elective  Indications for Airway Management:  Anesthesia      Spontaneous Ventilation: absent    Sedation Level:  Deep  Preoxygenated: Yes    Patient Position:  Sniffing  Mask Difficulty Assessment:  1 - vent by mask  Final Airway Type:  Endotracheal airway  Final Endotracheal Airway:  ETT  Cuffed: Yes    Technique Used for Successful ETT Placement:  Direct laryngoscopy    Insertion Site:  Oral  Blade Type:  Faith  Laryngoscope Blade/Videolaryngoscope Blade Size:  3  ETT Size (mm):  7.0  Measured from:  Teeth  ETT to Teeth (cm):  22  Placement Verified by: auscultation and capnometry    Cormack-Lehane Classification:  Grade IIa - partial view of glottis  Number of Attempts at Approach:  1

## 2022-11-14 NOTE — ANESTHESIA PREPROCEDURE EVALUATION
Case: [id number] Date/Time: 11/14/22 0945    Procedures:       Right Shoulder arthroscopy with rotator cuff repair including subscapularis, biceps tenodesis, subacromial decrompression, labral debridment, repairs as indicated (Right)      ARTHROSCOPY, SHOULDER, WITH BICEPS TENODESIS      DECOMPRESSION, SHOULDER, ARTHROSCOPIC      ARTHROSCOPY, SHOULDER, WITH EXTENSIVE DEBRIDEMENT    Diagnosis:       Traumatic tear of right rotator cuff, initial encounter [S46.011A]      Biceps tendonitis on right [M75.21]      Glenoid labral tear, right, initial encounter [S43.431A]      Subacromial impingement of right shoulder [M75.41]    Pre-op diagnosis:       Traumatic tear of right rotator cuff, initial encounter [S46.011A]      Biceps tendonitis on right [M75.21]      Glenoid labral tear, right, initial encounter [S43.431A]      Subacromial impingement of right shoulder [M75.41]    Location: [place] [address] / SURGERY [facility]    Surgeons: Oxana Medel M.D.          Relevant Problems   ANESTHESIA   (positive) PONV (postoperative nausea and vomiting)       Physical Exam    Airway   Mallampati: II  TM distance: >3 FB  Neck ROM: full       Cardiovascular - normal exam  Rhythm: regular  Rate: normal  (-) murmur     Dental - normal exam           Pulmonary - normal exam  Breath sounds clear to auscultation     Abdominal    Neurological - normal exam                 Anesthesia Plan    ASA 2       Plan - general and peripheral nerve block     Peripheral nerve block will be post-op pain control  Airway plan will be ETT          Induction: intravenous    Postoperative Plan: Postoperative administration of opioids is intended.    Pertinent diagnostic labs and testing reviewed    Informed Consent:    Anesthetic plan and risks discussed with patient.    Use of blood products discussed with: patient whom consented to blood products.

## 2022-11-14 NOTE — DISCHARGE INSTR - OTHER INFO
Discharge instructions:    General Instructions    You will be in a sling at all times for 6 weeks.  You may remove the sling to shower, do your home exercises, and when you are in physical therapy.    You will sleep in your sling - recliners or propping up with pillows works best    No active motion of the arm    Elevate the operative extremity when you are resting to help minimize the swelling.    Use ice to help control the swelling and pain.  DO NOT USE HEAT - this will increase the swelling.    Call the office if you develop fevers (over 100.5) or chills.    You should have an office appointment about 7-10 following your discharge from the hospital.  If you do not please call [phone].      Wound Care    You may shower 2 days after surgery if the wound is dry. Keep water exposure to the incision site brief and blot it dry when you get out.  Do not bathe or swim or Jacuzzi (ie. Do not submerge the incision) for approximately 3 to 4 weeks.    Do not use ointments or creams on the incision.      Keep the incision clean and dry.  Any drainage from the incision should be reported to the doctor immediately.      You may notice some bruising around the incision and into the operative extremity.  This is not uncommon and should begin to go away within the first 2 weeks after surgery.    At the time of surgery tape-like strips (Steri-strips) may be placed on your incision to protect it.  These will eventually come off on their own in one to two weeks, or you may remove them yourself after two weeks.    Activity    Estimated return to work varies depending on the demands of your job.  Some ambitious patients return to desk jobs / administrative type work as early as 1 week after surgery (but usually more like 1 month).  For active labor or heavy labor, it may take 3 to 6 months to return to work.     You should do the exercises given to you at discharge until you return for your post-op visit. At that time, you may  be given a new set of exercises.    You cannot drive while in the sling (for the first 6 weeks)      Medications    You were prescribed a short acting, narcotic pain medication.  It is recommended that you begin to wean off of this medication in about 3 days after surgery.  To help wean off of the pain medications or to supplement your pain control you can use Tylenol to help with pain.    You were prescribed an anti-inflammatory medication which you will take for 5 days after surgery.  Take with food if GI discomfort occurs.      You were prescribed an anti-nausea medication that you may take as needed.    You will not be discharged from the hospital with any antibiotics unless there are specific concerns regarding infection.

## 2022-11-14 NOTE — ANESTHESIA POSTPROCEDURE EVALUATION
Patient: Mari Sahni    Procedure Summary     Date: 11/14/22 Room / Location:  OR  / SURGERY [facility]    Anesthesia Start: 1114 Anesthesia Stop: 1346    Procedures:       Right Shoulder arthroscopy with rotator cuff repair including subscapularis, biceps tenodesis, subacromial decrompression, labral debridment, repairs as indicated (Right: Shoulder)      ARTHROSCOPY, SHOULDER, WITH BICEPS TENODESIS (Right: Shoulder)      DECOMPRESSION, SHOULDER, ARTHROSCOPIC (Right: Shoulder)      ARTHROSCOPY, SHOULDER, WITH EXTENSIVE DEBRIDEMENT (Right: Shoulder) Diagnosis:       Traumatic tear of right rotator cuff, initial encounter      Biceps tendonitis on right      Glenoid labral tear, right, initial encounter      Subacromial impingement of right shoulder      (Traumatic tear of right rotator cuff, initial encounter [S46.011A]Biceps tendonitis on right [M75.21]Glenoid labral tear, right, initial encounter [S43.431A]Subacromial impingement of right shoulder [M75.41])    Surgeons: Oxana Medel M.D. Responsible Provider: John Gtz M.D.    Anesthesia Type: general, peripheral nerve block ASA Status: 2          Final Anesthesia Type: general, peripheral nerve block  Last vitals  BP   Blood Pressure: (!) 140/77    Temp   36.3 °C (97.3 °F)    Pulse   83   Resp   14    SpO2   96 %      Anesthesia Post Evaluation    Patient location during evaluation: PACU  Patient participation: complete - patient participated  Level of consciousness: awake and alert  Pain score: 4    Airway patency: patent  Anesthetic complications: no  Cardiovascular status: hemodynamically stable  Respiratory status: acceptable  Hydration status: euvolemic    PONV: none          No notable events documented.     Nurse Pain Score: 4 (NPRS)

## 2022-11-14 NOTE — LETTER
November 3, 2022    Patient Name: Mari Sahni  Surgeon Name: Oxana Medel M.D.  Surgery Facility: [facility] ([address] [address] [facility])  Surgery Date: 11/14/22    The time of your surgery is not final and may change up to and until the day of your surgery. You will be contacted 24-48 hours prior to your surgery date with your check-in and surgery time.    If you will not be at one of the below numbers please call the surgery scheduler at [phone]  Preferred Phone: [phone]    BEFORE YOUR SURGERY   Pre Registration and/or Lab Work must be done within and no earlier than 28 days prior to your surgery date. Please call [facility] at [phone] for an appointment as soon as possible.    DAY OF YOUR SURGERY  Nothing to eat or drink after midnight     Refrain from smoking any substance after midnight prior to surgery. Smoking may interfere with the anesthetic and frequently produces nausea during the recovery period.    Continue taking all lifesaving medications. Including the morning of your surgery with small sip of water.    Please arrive at the hospital/surgery center at the check-in time provided.     An adult will need to bring you and take you home after your surgery.     AFTER YOUR SURGERY  Post op Appointment:   Date: 11.29.22   Time: 10:40 AM   With: Zak Howard P.A.-C.   Location: [address] [place] [id number]    - Therapy- Your first appointment should be 3  day(s) after your surgery. For your convenience we have 4 Physical Therapy locations: [facility], [place], and [facility]. Call our office ASAP to schedule an appointment at [phone] or take the enclosed Therapy Prescription to a facility of your choice.  - Post Surgery - You will need someone to drive you home  - Post Surgery - You will need someone to stay with you for 24 hours       TIME OFF WORK  FMLA or Disability forms can be faxed directly  to: [phone] or you may drop them off at [address] [place] [address]. Our office charges a $35.00 fee per form. Forms will be completed within 10 business days of drop off and payment received. For the status of your forms you may contact our disability office directly [phone].    MEDICATION INSTRUCTIONS **Please read section completely**    The following medications should be stopped a minimum of 10 days prior to surgery:  All over the counter, Supplements & Herbal medications    Anorectics: Phentermine (Adipex-P, Lomaira and Suprenza), Phentermine-topiramate (Qsymia), Bupropion-naltrexone (Contrave)    Opiod Partial Agonists/Opioid Antagonists: Buprenorphine (Subocone, Belbuca, Butrans, Probuphine Implant, Sublocade), Naltrexone (ReVia, Vivitrol), Naloxone    Amphetamines: Dextroamphetamine/Amphetamine (Adderall, Mydayis), Methylphenidate Hydrochloride (Concerta, Metadate, Methylin, Ritalin)    The following medications should be stopped 5 days prior to surgery:  Blood Thinners: Any Aspirin, Aspirin products, anti-inflammatories such as ibuprofen and any blood thinners such as Coumadin and Plavix. Please consult your prescribing physician if you are on life saving blood thinners, in regards to when to stop medications prior to surgery.     The following medications should be stopped a minimum of 3 days prior to surgery:  PDE-5 inhibitors: Sildenafil (Viagra), Tadalafil (Cialis), Vardenafil (Levitra), Avanafil (Stendra)    MAO Inhibitors: Rasagiline (Azilect), Selegiline (Eldepryl, Emsam, Selapar), Isocarboxazid (Marplan), Phenelzine (Nardil)

## 2022-11-14 NOTE — OR SURGEON
Immediate Post OP Note    PreOp Diagnosis: Right shoulder rotator tear including subscapularis tear, biceps tendon tear, labral tearing    PostOp Diagnosis: Right shoulder rotator tear including subscapularis tear, biceps tendon tear, labral tearing, and subacromial impingement    Procedure(s):  Right Shoulder arthroscopy with rotator cuff repair including subscapularis, biceps tenodesis, subacromial decrompression, labral debridment, repairs as indicated - Wound Class: Clean    Surgeon(s):  Oxana Medel M.D.    Anesthesiologist/Type of Anesthesia:  Anesthesiologist: John Gtz M.D./General    Surgical Staff:  Circulator: Halle Perez R.N.; Olga Ryan R.N.  Scrub Person: Amelia Alonso    Specimens removed if any:  * No specimens in log *    Estimated Blood Loss: 10 mL    Findings: There was a massive rotator cuff tear    Complications: None.        11/14/2022 1:56 PM Oxana Medel M.D.

## 2022-11-14 NOTE — H&P
Surgery Orthopedic History & Physical Note     Date  11/14/2022     Primary Care Physician  JOJO Schuster       Pre-Op Diagnosis Codes:     * Traumatic tear of right rotator cuff, initial encounter [S46.011A]     * Biceps tendonitis on right [M75.21]     * Glenoid labral tear, right, initial encounter [S43.431A]     * Subacromial impingement of right shoulder [M75.41]     HPI  This is a 62 y.o. female who presented with  weeks of worsening right shoulder pain and inability to lift up her right arm.  Suzette was in [place] filling out paperwork after her dad passed away when she was walking upstairs and got her sandal stuck on a step and then fell and landed hard onto her right arm and right knee.  She had severe right shoulder pain after this fall and was unable to lift up her arm.  She was then seen at [facility] where an MRI was ordered, but she was told that she moved too much during the MRI so it was not able to be completed.  Aranza is also having worsening neck spasms for which she sees Dr. Blackman for.  Since her fall, she has been unable to lift her arm above her waist and has severe pain especially at night.  She points to the anterior lateral aspect of the right shoulder is the most severe area of pain.  She denies any numbness or tingling distally.     Past Medical History        Past Medical History:   Diagnosis Date    Pain       neck, shoulder    PONV (postoperative nausea and vomiting)              Past Surgical History         Past Surgical History:   Procedure Laterality Date    OTHER ORTHOPEDIC SURGERY         back pain x 6 months    MT BREAST AUGMENTATION WITH IMPLANT                Current Medications             Current Facility-Administered Medications   Medication Dose Route Frequency Provider Last Rate Last Admin    acetaminophen (TYLENOL) tablet 1,000 mg  1,000 mg Oral Once John Gtz M.D.        celecoxib (CELEBREX) capsule 400 mg  400 mg Oral Once John Gtz M.D.         scopolamine (TRANSDERM-SCOP) patch 1 Patch  1 Patch Transdermal Once John Gtz M.D.        ceFAZolin (ANCEF) injection 2 g  2 g Intravenous Once Zak Howard P.A.-C.        lidocaine (XYLOCAINE) 1 % injection 0.5 mL  0.5 mL Intradermal Once PRN Oxana Medel M.D.                Social History               Socioeconomic History    Marital status:        Spouse name: Not on file    Number of children: Not on file    Years of education: Not on file    Highest education level: Not on file   Occupational History    Not on file   Tobacco Use    Smoking status: Former       Packs/day: 0.75       Years: 15.00       Pack years: 11.25       Types: Cigarettes       Quit date:        Years since quittin.8    Smokeless tobacco: Never    Tobacco comments:       QUIT 20+ YEARS AGO - PER PT, 2022   Vaping Use    Vaping Use: Never used   Substance and Sexual Activity    Alcohol use: Yes       Comment: 3-4X WEEKLY    Drug use: Not on file       Comment: cbd    Sexual activity: Not on file   Other Topics Concern    Not on file   Social History Narrative    Not on file      Social Determinants of Health      Financial Resource Strain: Not on file   Food Insecurity: Not on file   Transportation Needs: Not on file   Physical Activity: Not on file   Stress: Not on file   Social Connections: Not on file   Intimate Partner Violence: Not on file   Housing Stability: Not on file            Family History         Family History   Problem Relation Age of Onset    Heart Disease Mother           Angioplasty 40s    Hyperlipidemia Mother      Diabetes Mother      Hypertension Mother      Arthritis Father      Other Father           Alive at 82, has Parkinsons    Hyperlipidemia Father      Hypertension Sister      Hyperlipidemia Sister      Heart Attack Paternal Grandfather            of MI in 60s, CABG, had PM            Allergies  Codeine and Pcn [penicillins]     Review of Systems  Negative except  for right shoulder pain     Physical Exam     Vital Signs  Blood Pressure: 135/82   Temperature: 37.1 °C (98.7 °F)   Pulse: 90   Respiration: 16   Pulse Oximetry: 97 %         GENERAL: A+Ox3. INAD. Well appearing and well groomed.  MENTAL STATUS: Pleasant and cooperative. Alert and oriented x3 with normal mood and affect.  Vascular: Pulses are palpable 2+, capillary refills to digits are normal.  Skin: No wounds/lesions/ulcers. Skin warm & dry.  Respiratory: No respiratory distress     MUSCULOSKELETAL:    Right UPPER EXTREMITY:   Normal posture.  Shoulder: No swelling.  Active Range of Motion: Forward Flexion 60, Abduction 60, ER 45, IR to her back pocket with severe pain throughout motion  Passive is symmetric to active.  Strength: 4/5 Supraspinatus, 4/5 External Rotators, 3/5 Subscapularis  Biceps: Very tender to palpation, positive Lowman's, positive speeds  Tenderness: Tender over the anterior and lateral shoulder, nontender over the AC joint  Impingement Signs: Positive Leos, positive Neer.  Positive belly press, positive empty can  Stability: Normal.  Sensation intact to light touch in the bilateral upper extremities.  2+ radial pulses.     ==========     IMAGING FINDINGS:      I personally reviewed the images independent of the radiology read.     4 view radiographs of the right shoulder were reviewed by me today which demonstrate no acute osseous abnormality.  She has no evidence of glenohumeral arthritis or high riding humerus.       This MRI was conducted on 10/26/2022.  These images demonstrate a complete full-thickness with tear of the entire subscapularis tendon with retraction beyond the glenoid.  There is displacement of the biceps tendon medially outside the bicipital groove due to overlying subscapularis tear.  There appears to be a full-thickness tear of the anterior supraspinatus tendon.      ==========     ASSESSMENT & PLAN: Aranza is a very nice 62-year-old female now 3 weeks status post a  fall onto her right shoulder with inability to lift up her arm with imaging and exam today concerning for a diagnosis of an acute rotator cuff tear.     -I discussed these findings at length with Aranza today.  We discussed multiple treatment options.  Given the acuity of her injury, significant pain, and extreme difficulty with range of motion, and activity level we did discuss surgery today.  I informed her that her best surgical option would be a right shoulder arthroscopy with a rotator cuff repair including the subscapularis tendon, biceps tenodesis, subacromial decompression, and other repairs as indicated.  We discussed the possibility that the subscapularis tendon may not be repairable given the significant retraction.  I discussed the rehabilitation and recovery process at length with Suzette today as well.      It was explained to the patient that any surgical procedure carries with it the risks of loss of limb or loss of life. Medical complications include but are not limited to death or disability from a heart attack, stroke, GI bleeding, thrombophlebitis and pulmonary embolism, sepsis, adverse reactions (death) due to blood transfusions, allergy or adverse drug reaction. There are other rare, unknown and uncommon systemic conditions that could also adversely affect the systemic outcome. Local complications include but are not limited to wound dehiscence, deep infection, failure of fixation or reconstruction, damage to nerves and vessels which could be temporary or permanent, local recurrence as well as other rare, uncommon and unknown local complications that may necessitate re-operation, more complex orthopaedic reconstructions or amputation. The Patient was informed, questions were answered, and the consents were signed.  They understood the procedure and despite the risks decided to proceed with surgery.     -Patient was educated on clinical and imaging findings.  -Patient verbalizes understanding of  all discussions today, and all questions were answered satisfactorily.         Oxana Medel MD

## 2022-11-14 NOTE — ANESTHESIA PROCEDURE NOTES
Peripheral Block    Date/Time: 11/14/2022 11:03 AM  Performed by: John Gtz M.D.  Authorized by: John Gtz M.D.     Patient Location:  Pre-op  Start Time:  11/14/2022 11:03 AM  End Time:  11/14/2022 11:08 AM  Reason for Block: at surgeon's request and post-op pain management ONLY    patient identified, IV checked, site marked, risks and benefits discussed, surgical consent, monitors and equipment checked, pre-op evaluation and timeout performed    Patient Position:  Supine  Prep: ChloraPrep    Monitoring:  Heart rate, continuous pulse ox and cardiac monitor  Block Region:  Upper Extremity  Upper Extremity - Block Type:  BRACHIAL PLEXUS block, Interscalene approach    Laterality:  Right  Procedures: ultrasound guided  Image captured, interpreted and electronically stored.  Local Infiltration:  Lidocaine  Strength:  1 %  Dose:  3 ml  Block Type:  Single-shot  Needle Length:  50mm  Needle Gauge:  22 G  Needle Localization:  Ultrasound guidance  Injection Assessment:  Negative aspiration for heme, no paresthesia on injection, incremental injection and local visualized surrounding nerve on ultrasound  Evidence of intravascular injection: No     US Guided Interscalene Brachial Plexus Block   US transducer placed on the neck in oblique plane approximately at the level of C6.  Anterior and Middle Scalene (MSM) muscles identified with nerve trunks identified between the muscles.  Needle inserted lateral to probe and advanced under direct visualization through the MSM into a perineural position.  After negative aspiration, LA injected with ease and visualized surrounding the nerve trunks.

## 2022-11-14 NOTE — DISCHARGE INSTRUCTIONS
What to Expect Post Anesthesia    Rest and take it easy for the first 24 hours.  A responsible adult is recommended to remain with you during that time.  It is normal to feel sleepy.  We encourage you to not do anything that requires balance, judgment or coordination.    FOR 24 HOURS DO NOT:  Drive, operate machinery or run household appliances.  Drink beer or alcoholic beverages.  Make important decisions or sign legal documents.    To avoid nausea, slowly advance diet as tolerated, avoiding spicy or greasy foods for the first day.  Add more substantial food to your diet according to your provider's instructions.  Babies can be fed formula or breast milk as soon as they are hungry.  INCREASE FLUIDS AND FIBER TO AVOID CONSTIPATION.    MILD FLU-LIKE SYMPTOMS ARE NORMAL.  YOU MAY EXPERIENCE GENERALIZED MUSCLE ACHES, THROAT IRRITATION, HEADACHE AND/OR SOME NAUSEA.    If any questions arise, call your provider, Dr. Medel [phone].  If your provider is not available, please feel free to call the Surgical Center at [phone].    MEDICATIONS: Resume taking daily medication.  Take prescribed pain medication with food.  If no medication is prescribed, you may take non-aspirin pain medication if needed.  PAIN MEDICATION CAN BE VERY CONSTIPATING.  Take a stool softener or laxative such as senokot, pericolace, or milk of magnesia if needed.    Last pain medication given at 02:01 PM, 10 mg Oxycodone      Remove scopolamine patch behind right ear by 11/17/22 @ noon. Wash hands thoroughly and discard appropriately.

## 2022-12-12 ENCOUNTER — OFFICE VISIT (OUTPATIENT)
Dept: ENDOCRINOLOGY | Facility: MEDICAL CENTER | Age: 62
End: 2022-12-12
Payer: COMMERCIAL

## 2022-12-12 VITALS
HEART RATE: 88 BPM | OXYGEN SATURATION: 99 % | WEIGHT: 158 LBS | SYSTOLIC BLOOD PRESSURE: 112 MMHG | BODY MASS INDEX: 24.8 KG/M2 | HEIGHT: 67 IN | DIASTOLIC BLOOD PRESSURE: 70 MMHG

## 2022-12-12 DIAGNOSIS — E78.01 FAMILIAL HYPERCHOLESTEROLEMIA: ICD-10-CM

## 2022-12-12 DIAGNOSIS — E03.9 PRIMARY HYPOTHYROIDISM: ICD-10-CM

## 2022-12-12 DIAGNOSIS — E55.9 VITAMIN D DEFICIENCY: ICD-10-CM

## 2022-12-12 PROCEDURE — 99212 OFFICE O/P EST SF 10 MIN: CPT

## 2022-12-12 PROCEDURE — 99205 OFFICE O/P NEW HI 60 MIN: CPT

## 2022-12-12 RX ORDER — LIOTHYRONINE SODIUM 5 UG/1
5 TABLET ORAL 2 TIMES DAILY
Qty: 45 TABLET | Refills: 2 | Status: SHIPPED | OUTPATIENT
Start: 2022-12-12 | End: 2023-02-08

## 2022-12-12 RX ORDER — CHOLECALCIFEROL (VITAMIN D3) 50 MCG
4000 TABLET ORAL DAILY
COMMUNITY

## 2022-12-12 RX ORDER — EZETIMIBE 10 MG/1
10 TABLET ORAL DAILY
Qty: 30 TABLET | Refills: 1 | Status: SHIPPED | OUTPATIENT
Start: 2022-12-12 | End: 2023-01-13

## 2022-12-12 RX ORDER — LEVOTHYROXINE SODIUM 112 UG/1
112 TABLET ORAL
Qty: 90 TABLET | Refills: 3 | Status: SHIPPED | OUTPATIENT
Start: 2022-12-12 | End: 2024-02-06

## 2022-12-12 ASSESSMENT — FIBROSIS 4 INDEX: FIB4 SCORE: 0.88

## 2022-12-12 NOTE — PROGRESS NOTES
Chief Complaint: Consult requested by JOJO Schuster for evaluation of Hypothyroidism    HPI:     Mari Sahni is a 62 y.o. female with history of Hypothyroidism diagnosed many years ago and is here for initial evaluation.  States having major surgery 4 weeks ago and is undergoing physical therapy.    Hypothyroidism  She reports the following symptoms: feels sluggish    She denies fatigue, weight changes, heat/cold intolerance, bowel/skin changes or CVS symptoms.      She denies lumps or enlargement in the neck.    She reports a family history of Hypothyroidism with her siblings.       She is currently on Levothyroxine 112mcg daily and Liothyronine(cytomel) 5mcg 1 tab in the morning and 1 tab in the afternoon which has been her thyroid hormone dose since 4 months.       She denies any recent dose changes.     She denies Good compliance and takes her thyroid hormone daily before breakfast.      She denies taking any iron, calcium supplements or antacids.  She denies taking cytomel prior to having the labs drawn.    She reports taking biotin which she just started a few weeks ago.      Latest Reference Range & Units 07/25/22 07:39   TSH 0.380 - 5.330 uIU/mL 0.070 (L)   Free T-4 0.93 - 1.70 ng/dL 1.20   T3,Free 2.00 - 4.40 pg/mL 3.80      Latest Reference Range & Units 07/25/22 07:39   Anti-Thyroglobulin 0.0 - 4.0 IU/mL <0.9       2. Vitamin D Deficiency  Currently taking Vitamin D 4000 units daily.   Patient states she has been on and off with the supplement. She will now start taking Vitamin D daily.    Latest Reference Range & Units 04/08/22 08:36 07/25/22 07:39   25-Hydroxy   Vitamin D 25 30 - 100 ng/mL 64 63     3. Hyperlipidemia  Currently not on any statins.   Patient has family history of hyperlipidemia  Patient states she was unable to tolerate statins due to muscle aches.    Latest Reference Range & Units 07/25/22 07:39   Cholesterol,Tot 100 - 199 mg/dL 253 (H)   Triglycerides 0 - 149 mg/dL 155  (H)   HDL >=40 mg/dL 53      Latest Reference Range & Units 07/25/22 07:39   LDL <100 mg/dL 169 (H)     Patient's medications, allergies, and social histories were reviewed and updated as appropriate.      ROS:     CONS:     No fever, no chills, no weight loss, no fatigue   EYES:      No diplopia, no blurry vision, no redness of eyes, no swelling of eyelids   ENT:    No hearing loss, No ear pain, No sore throat, no dysphagia, no neck swelling   CV:     No chest pain, no palpitations, no claudication, no orthopnea, no PND   PULM:    No SOB, no cough, no hemoptysis, no wheezing    GI:   No nausea, no vomiting, no diarrhea, no constipation, no bloody stools   :  Passing urine well, no dysuria, no hematuria   ENDO:   No polyuria, no polydipsia, no heat intolerance, no cold intolerance   NEURO: No headaches, no dizziness, no convulsions, no tremors   MUSC:  No joint swellings, no arthralgias, no myalgias, no weakness   SKIN:   No rash, no ulcers, no dry skin   PSYCH:   No depression, no anxiety, no difficulty sleeping       Past Medical History:  Patient Active Problem List    Diagnosis Date Noted    Familial hypercholesterolemia 12/12/2022    Vitamin D deficiency 12/12/2022    Primary hypothyroidism 12/12/2022    PONV (postoperative nausea and vomiting) 11/14/2022    Traumatic tear of right rotator cuff, initial encounter 10/27/2022    Biceps tendonitis on right 10/27/2022    Glenoid labral tear, right, initial encounter 10/27/2022    Subacromial impingement of right shoulder 10/27/2022       Past Surgical History:  Past Surgical History:   Procedure Laterality Date    PB SHLDR ARTHROSCOP,SURG,W/ROTAT CUFF REPB Right 11/14/2022    Procedure: Right Shoulder arthroscopy with rotator cuff repair including subscapularis, biceps tenodesis, subacromial decrompression, labral debridment, repairs as indicated;  Surgeon: Oxana Medel M.D.;  Location: SURGERY [facility];  Service: Orthopedics    PB ARTHROSCOPY  SHOULDER SURGICAL BICEPS TENODES* Right 11/14/2022    Procedure: ARTHROSCOPY, SHOULDER, WITH BICEPS TENODESIS;  Surgeon: Oxana Medel M.D.;  Location: SURGERY [facility];  Service: Orthopedics    MS LDR ARTHROSCOP,PART ACROMIOPLAS Right 11/14/2022    Procedure: DECOMPRESSION, SHOULDER, ARTHROSCOPIC;  Surgeon: Oxana Medel M.D.;  Location: SURGERY [facility];  Service: Orthopedics    [facility] Right 11/14/2022    Procedure: ARTHROSCOPY, SHOULDER, WITH EXTENSIVE DEBRIDEMENT;  Surgeon: Oxana Medel M.D.;  Location: SURGERY [facility];  Service: Orthopedics    OTHER ORTHOPEDIC SURGERY      back pain x 6 months    MS BREAST AUGMENTATION WITH IMPLANT          Allergies:  Codeine and Pcn [penicillins]     Current Medications:    Current Outpatient Medications:     Multiple Vitamin (MULTIVITAMIN ADULT PO), Take  by mouth., Disp: , Rfl:     Cholecalciferol (VITAMIN D) 2000 UNIT Tab, Take 4,000 Units by mouth every day., Disp: , Rfl:     ezetimibe (ZETIA) 10 MG Tab, Take 1 Tablet by mouth every day., Disp: 30 Tablet, Rfl: 1    liothyronine (CYTOMEL) 5 MCG Tab, TAKE 1 TABLET ORALLY IN THE MORNING AND 1 TABLET AT NOON, Disp: , Rfl:     acetaminophen (TYLENOL) 325 MG Tab, Take 2 Tablets by mouth every four hours as needed., Disp: , Rfl:     levothyroxine (SYNTHROID) 112 MCG Tab, Take 1 Tablet by mouth every morning on an empty stomach., Disp: , Rfl:     zolpidem (AMBIEN CR) 12.5 MG CR tablet, TAKE 1 TABLET ORALLY 1 HOUR BEFORE BEDTIME AS NEEDED FOR INSOMNIA 7-3-19 G47.01, Disp: , Rfl: 0    DULoxetine HCl (CYMBALTA PO), Take 120 mg by mouth every day., Disp: , Rfl:     Social History:  Social History     Socioeconomic History    Marital status:      Spouse name: Not on file    Number of children: Not on file    Years of education: Not on file    Highest education level: Not on file   Occupational History    Not on file   Tobacco Use    Smoking status: Former      "Packs/day: 0.75     Years: 15.00     Pack years: 11.25     Types: Cigarettes     Quit date:      Years since quittin.9    Smokeless tobacco: Never    Tobacco comments:     QUIT 20+ YEARS AGO - PER PT, 2022   Vaping Use    Vaping Use: Never used   Substance and Sexual Activity    Alcohol use: Yes     Comment: 3-4X WEEKLY    Drug use: Not on file     Comment: cbd    Sexual activity: Not on file   Other Topics Concern    Not on file   Social History Narrative    Not on file     Social Determinants of Health     Financial Resource Strain: Not on file   Food Insecurity: Not on file   Transportation Needs: Not on file   Physical Activity: Not on file   Stress: Not on file   Social Connections: Not on file   Intimate Partner Violence: Not on file   Housing Stability: Not on file        Family History:   Family History   Problem Relation Age of Onset    Heart Disease Mother         Angioplasty 40s    Hyperlipidemia Mother     Diabetes Mother     Hypertension Mother     Arthritis Father     Other Father         Alive at 82, has Parkinsons    Hyperlipidemia Father     Hypertension Sister     Hyperlipidemia Sister     Heart Attack Paternal Grandfather          of MI in 60s, CABG, had PM         PHYSICAL EXAM:   Vital signs: /70   Pulse 88   Ht 1.702 m (5' 7\")   Wt 71.7 kg (158 lb)   SpO2 99%   BMI 24.75 kg/m²   GENERAL: Well-developed, well-nourished  in no apparent distress.   EYE: No ocular and eyelid asymmetry, Anicteric sclerae,  PERRL  HENT: Hearing grossly intact, Normocephalic, atraumatic. Pink, moist mucous membranes, No exudate  NECK: Supple. Trachea midline. thyroid is normal in size without nodules or tenderness  CARDIOVASCULAR: Regular rate and rhythm. No murmurs, rubs, or gallops.   LUNGS: Clear to auscultation bilaterally   ABDOMEN: Soft, nontender with positive bowel sounds.   EXTREMITIES: No clubbing, cyanosis, or edema.   NEUROLOGICAL: Cranial nerves II-XII are grossly intact "   Symmetric reflexes at the patella no proximal muscle weakness  LYMPH: No cervical, supraclavicular,  adenopathy palpated.   SKIN: No rashes, lesions. Turgor is normal.    Labs:  Lab Results   Component Value Date/Time    WBC 6.3 07/25/2022 07:39 AM    RBC 4.23 07/25/2022 07:39 AM    HEMOGLOBIN 13.1 07/25/2022 07:39 AM    MCV 96.5 07/25/2022 07:39 AM    MCH 31.0 07/25/2022 07:39 AM    MCHC 32.1 (L) 07/25/2022 07:39 AM    RDW 45.6 07/25/2022 07:39 AM    MPV 11.2 07/25/2022 07:39 AM       Lab Results   Component Value Date/Time    SODIUM 138 07/25/2022 07:39 AM    POTASSIUM 3.9 07/25/2022 07:39 AM    CHLORIDE 102 07/25/2022 07:39 AM    CO2 27 07/25/2022 07:39 AM    ANION 9.0 07/25/2022 07:39 AM    GLUCOSE 94 07/25/2022 07:39 AM    BUN 28 (H) 07/25/2022 07:39 AM    CREATININE 0.70 07/25/2022 07:39 AM    CALCIUM 9.3 07/25/2022 07:39 AM    ASTSGOT 20 07/25/2022 07:39 AM    ALTSGPT 12 07/25/2022 07:39 AM    TBILIRUBIN 0.4 07/25/2022 07:39 AM    ALBUMIN 4.3 07/25/2022 07:39 AM    TOTPROTEIN 6.8 07/25/2022 07:39 AM    GLOBULIN 2.5 07/25/2022 07:39 AM    AGRATIO 1.7 07/25/2022 07:39 AM       Lab Results   Component Value Date/Time    CHOLSTRLTOT 253 (H) 07/25/2022 0739    TRIGLYCERIDE 155 (H) 07/25/2022 0739    HDL 53 07/25/2022 0739     (H) 07/25/2022 0739       Lab Results   Component Value Date/Time    TSHULTRASEN 0.070 (L) 07/25/2022 0739     Lab Results   Component Value Date/Time    FREET4 1.20 07/25/2022 0739     Lab Results   Component Value Date/Time    FREET3 3.80 07/25/2022 0739     No results found for: THYSTIMIG    No results found for: MICROSOMALA      Imaging:      ASSESSMENT/PLAN:     1. Primary hypothyroidism  Unstable  Reviewed blood work in details.   Continue taking levothyroxine 112 mcg daily.  We will change liothyronine 5 mcg 2 times daily to 5 mcg in the morning and 2.5 mcg in the afternoon.  Patient will notify me via [employer] message should she experience symptoms of hyperthyroidism.    Advised patient to stop taking biotin 5 days prior to getting the blood work.  We will also obtain an ultrasound of her thyroid for baseline.  We will repeat labs in 1 month.  - TSH; Future  - FREE THYROXINE; Future  - US-THYROID; Future  - T3 FREE; Future    2. Vitamin D deficiency  Stable  Patient states she has not been taking vitamin D supplements regularly, however she will now start taking them daily.  Continue with vitamin D 4000 units daily  We will repeat labs for monitoring  - VITAMIN D,25 HYDROXY (DEFICIENCY); Future    3. Familial hypercholesterolemia  Unstable  Discussed risk factors associated with hypercholesterolemia.  Patient understands the benefit of exercising and eating healthy however due to her recent surgery she is unable to contribute to exercising and healthy eating.  Patient in agreement to try Zetia as she was unable to tolerate statins.  Patient will notify via [employer] if she is unable to tolerate the medication.  Medication sent to preferred pharmacy.  We will follow-up with labs in 1 month.  - ezetimibe (ZETIA) 10 MG Tab; Take 1 Tablet by mouth every day.  Dispense: 30 Tablet; Refill: 1  - Lipid Profile; Future     Return in about 5 weeks (around 1/16/2023).  Patient will have blood work done 1 to 2 weeks prior to next appointment in 5 weeks.  Patient understands it is fasting labs.    Over 60 minutes was spent on this visit reviewing blood work, establishing care, and answering questions.     This patient during there office visit was started on new medication.  Side effects of new medications were discussed with the patient today in the office. The patient was supplied paperwork on this new medication.    Thank you kindly for allowing me to participate in the thyroid care plan for this patient.      ISABELLE Wood   12/12/22    CC:   JOJO Schuster

## 2023-01-06 ENCOUNTER — HOSPITAL ENCOUNTER (OUTPATIENT)
Dept: RADIOLOGY | Facility: MEDICAL CENTER | Age: 63
End: 2023-01-06
Payer: COMMERCIAL

## 2023-01-06 DIAGNOSIS — E03.9 PRIMARY HYPOTHYROIDISM: ICD-10-CM

## 2023-01-06 PROCEDURE — 76536 US EXAM OF HEAD AND NECK: CPT

## 2023-01-09 ENCOUNTER — HOSPITAL ENCOUNTER (OUTPATIENT)
Dept: LAB | Facility: MEDICAL CENTER | Age: 63
End: 2023-01-09
Payer: COMMERCIAL

## 2023-01-09 DIAGNOSIS — E03.9 PRIMARY HYPOTHYROIDISM: ICD-10-CM

## 2023-01-09 DIAGNOSIS — E55.9 VITAMIN D DEFICIENCY: ICD-10-CM

## 2023-01-09 DIAGNOSIS — E78.01 FAMILIAL HYPERCHOLESTEROLEMIA: ICD-10-CM

## 2023-01-09 LAB
25(OH)D3 SERPL-MCNC: 58 NG/ML (ref 30–100)
CHOLEST SERPL-MCNC: 240 MG/DL (ref 100–199)
FASTING STATUS PATIENT QL REPORTED: NORMAL
HDLC SERPL-MCNC: 65 MG/DL
LDLC SERPL CALC-MCNC: 151 MG/DL
T3FREE SERPL-MCNC: 2.49 PG/ML (ref 2–4.4)
T4 FREE SERPL-MCNC: 1.06 NG/DL (ref 0.93–1.7)
TRIGL SERPL-MCNC: 118 MG/DL (ref 0–149)
TSH SERPL DL<=0.005 MIU/L-ACNC: 0.26 UIU/ML (ref 0.38–5.33)

## 2023-01-09 PROCEDURE — 80061 LIPID PANEL: CPT

## 2023-01-09 PROCEDURE — 84439 ASSAY OF FREE THYROXINE: CPT

## 2023-01-09 PROCEDURE — 84481 FREE ASSAY (FT-3): CPT

## 2023-01-09 PROCEDURE — 36415 COLL VENOUS BLD VENIPUNCTURE: CPT

## 2023-01-09 PROCEDURE — 82306 VITAMIN D 25 HYDROXY: CPT

## 2023-01-09 PROCEDURE — 84443 ASSAY THYROID STIM HORMONE: CPT

## 2023-01-10 ENCOUNTER — OFFICE VISIT (OUTPATIENT)
Dept: ENDOCRINOLOGY | Facility: MEDICAL CENTER | Age: 63
End: 2023-01-10
Payer: COMMERCIAL

## 2023-01-10 VITALS
BODY MASS INDEX: 25.88 KG/M2 | SYSTOLIC BLOOD PRESSURE: 120 MMHG | HEIGHT: 67 IN | DIASTOLIC BLOOD PRESSURE: 76 MMHG | HEART RATE: 106 BPM | OXYGEN SATURATION: 97 % | WEIGHT: 164.9 LBS

## 2023-01-10 DIAGNOSIS — E03.9 PRIMARY HYPOTHYROIDISM: ICD-10-CM

## 2023-01-10 DIAGNOSIS — E55.9 VITAMIN D DEFICIENCY: ICD-10-CM

## 2023-01-10 DIAGNOSIS — E78.5 HYPERLIPIDEMIA, UNSPECIFIED HYPERLIPIDEMIA TYPE: ICD-10-CM

## 2023-01-10 PROCEDURE — 99211 OFF/OP EST MAY X REQ PHY/QHP: CPT

## 2023-01-10 PROCEDURE — 99214 OFFICE O/P EST MOD 30 MIN: CPT

## 2023-01-10 RX ORDER — TIZANIDINE 2 MG/1
TABLET ORAL
COMMUNITY
Start: 2023-01-03

## 2023-01-10 ASSESSMENT — FIBROSIS 4 INDEX: FIB4 SCORE: 0.88

## 2023-01-10 ASSESSMENT — PATIENT HEALTH QUESTIONNAIRE - PHQ9: CLINICAL INTERPRETATION OF PHQ2 SCORE: 0

## 2023-01-10 NOTE — PROGRESS NOTES
Chief Complaint: Follow up for Primary Hypothyroidism    HPI:     Mari Sahni is a 62 y.o. female here for follow up of Primary Hypothyroidism.  Since last visit patient reports feeling good.      Hypothyroidism    Since last visit: feel sluggish - better    She remains on Levothyroxine 112mcg and Cytomel 5mcg in the morning and 2.5mcg in the evening daily which has been her dose for the past 6 weeks.       She reports good compliance and denies missing any daily doses.     She takes thyroid hormone prior to breakfast.     She  denies taking any iron, calcium supplements or antacids.    She reports taking multivitamin.     Weight has been stable    She currently reports fatigue.     She currently denies feeling cold and cold intolerance, constipation, swelling, anxiousness, feeling excessive energy, tremulousness, palpitations, sweating, weight loss, diarrhea, change in skin,  nails, or hair, heat intolerance, depression, goiter, ocular symptoms.        Latest Reference Range & Units 01/09/23 06:24   TSH 0.380 - 5.330 uIU/mL 0.260 (L)   Free T-4 0.93 - 1.70 ng/dL 1.06   T3,Free 2.00 - 4.40 pg/mL 2.49     US of Thyroid on 1/6/2023 showed  Subcentimeter hypoechoic nodule in the RIGHT mid thyroid lobe of no clinical significance.    Impression:       Unremarkable thyroid ultrasound.     2. Vitamin D deficiency  Currently taking 4000IU daily   Latest Reference Range & Units 01/09/23 06:24   25-Hydroxy   Vitamin D 25 30 - 100 ng/mL 58     3. Hyperlipidemia  Zetia 10mg daily  C/o mild muscle aches in the morning but tolerable   Latest Reference Range & Units 01/09/23 06:24   Cholesterol,Tot 100 - 199 mg/dL 240 (H)   Triglycerides 0 - 149 mg/dL 118   HDL >=40 mg/dL 65      Latest Reference Range & Units 01/09/23 06:24   LDL <100 mg/dL 151 (H)     Patient's medications, allergies, and social histories were reviewed and updated as appropriate.      ROS:     CONS:     No fever, no chills   EYES:     No diplopia, no blurry  vision   CV:           No chest pain, no palpitations   PULM:     No SOB, no cough, no hemoptysis.   GI:            No nausea, no vomiting, no diarrhea, no constipation   ENDO:     No polyuria, no polydipsia, no heat intolerance, no cold intolerance       Past Medical History:  Problem List:  2022-12: Familial hypercholesterolemia  2022-12: Vitamin D deficiency  2022-12: Primary hypothyroidism  2022-11: PONV (postoperative nausea and vomiting)  2022-10: Traumatic tear of right rotator cuff, initial encounter  2022-10: Biceps tendonitis on right  2022-10: Glenoid labral tear, right, initial encounter  2022-10: Subacromial impingement of right shoulder      Past Surgical History:  Past Surgical History:   Procedure Laterality Date    PB SHLDR ARTHROSCOP,SURG,W/ROTAT CUFF REPB Right 11/14/2022    Procedure: Right Shoulder arthroscopy with rotator cuff repair including subscapularis, biceps tenodesis, subacromial decrompression, labral debridment, repairs as indicated;  Surgeon: Oxana Medel M.D.;  Location: SURGERY [facility];  Service: Orthopedics    PB ARTHROSCOPY SHOULDER SURGICAL BICEPS TENODES* Right 11/14/2022    Procedure: ARTHROSCOPY, SHOULDER, WITH BICEPS TENODESIS;  Surgeon: Oxana Medel M.D.;  Location: [facility];  Service: Orthopedics    [facility]CROMIOPLAS Right 11/14/2022    Procedure: DECOMPRESSION, SHOULDER, ARTHROSCOPIC;  Surgeon: Oxana Medel M.D.;  Location: SURGERY [facility];  Service: Orthopedics    [facility]    Procedure: ARTHROSCOPY, SHOULDER, WITH EXTENSIVE DEBRIDEMENT;  Surgeon: Oxana Medel M.D.;  Location: SURGERY [facility];  Service: Orthopedics    OTHER ORTHOPEDIC SURGERY      back pain x 6 months    IN BREAST AUGMENTATION WITH IMPLANT          Allergies:  Codeine and Pcn [penicillins]     Social History:  Social History     Tobacco Use    Smoking status: Former     Packs/day:  "0.75     Years: 15.00     Pack years: 11.25     Types: Cigarettes     Quit date:      Years since quittin.0    Smokeless tobacco: Never    Tobacco comments:     QUIT 20+ YEARS AGO - PER PT, 2022   Vaping Use    Vaping Use: Never used   Substance Use Topics    Alcohol use: Yes     Comment: 3-4X WEEKLY        Family History:   family history includes Arthritis in her father; Diabetes in her mother; Heart Attack in her paternal grandfather; Heart Disease in her mother; Hyperlipidemia in her father, mother, and sister; Hypertension in her mother and sister; Other in her father.      PHYSICAL EXAM:   Vital signs: /76 (BP Location: Left arm, Patient Position: Sitting, BP Cuff Size: Adult)   Pulse (!) 106   Ht 1.702 m (5' 7\")   Wt 74.8 kg (164 lb 14.4 oz)   SpO2 97%   BMI 25.83 kg/m²   GENERAL: Well-developed, well-nourished in no apparent distress.   EYE:  No ocular asymmetry, PERRLA  HENT: Pink, moist mucous membranes.    NECK: No thyromegaly.   CARDIOVASCULAR:  No murmurs  LUNGS: Clear breath sounds  ABDOMEN: Soft, nontender   EXTREMITIES: No clubbing, cyanosis, or edema.   NEUROLOGICAL: No gross focal motor abnormalities   LYMPH: No cervical adenopathy palpated.   SKIN: No rashes, lesions.       Labs:  Lab Results   Component Value Date/Time    CHOLSTRLTOT 240 (H) 2023    TRIGLYCERIDE 118 202324    HDL 65 2023     (H) 2023       Lab Results   Component Value Date/Time    TSHULTRASEN 0.260 (L) 202324     Lab Results   Component Value Date/Time    FREET4 1.06 2023     Lab Results   Component Value Date/Time    FREET3 2.49 2023     No results found for: THYSTIMIG    No results found for: MICROSOMALA      Imaging:      ASSESSMENT/PLAN:     1. Primary hypothyroidism  Stable  Continue current regimen  Patient understands Take your thyroid medication daily as discussed at visit.  Thyroid hormone should be taken first in " the morning 30 minutes before eating.   Wait and take any iron supplements or multivitamins containing iron at least 6 hours after levothyroxine dose.  Notify me for symptoms of hypothyroidism including fatigue, cold intolerance, unexplained weight gain, constipation, depression, or dry skin.  Notify me for symptoms of hyperthyroidism including heat intolerance, fatigue, weight loss, heart racing, palpitations, anxiety, or tremor.  Please have your labs drawn prior to your next visit so that we may discuss them at the time of your next visit.   - T3 FREE; Future  - TSH; Future  - FREE THYROXINE; Future  2. Vitamin D deficiency  Stable  Continue current regimen  Please have your labs drawn prior to your next visit so that we may discuss them at the time of your next visit.   - VITAMIN D,25 HYDROXY (DEFICIENCY); Future    3. Hyperlipidemia, unspecified hyperlipidemia type  Unstable  Continue current regimen  I recommend continued efforts to follow a low fat, low cholesterol diet.  I also recommend you schedule time for regular daily exercise.  Take your medications as we discussed at your visit.  Notify me for any difficulty tolerating your medications.        Return in about 6 months (around 7/10/2023). Patient will have blood work completed 1 week prior to next apt in 6 months.       Thank you kindly for allowing me to participate in the thyroid care plan for this patient.    Duke Espinosa, APRN   01/10/23    CC:   JOJO Schuster

## 2023-01-13 DIAGNOSIS — E78.01 FAMILIAL HYPERCHOLESTEROLEMIA: ICD-10-CM

## 2023-01-13 RX ORDER — EZETIMIBE 10 MG/1
10 TABLET ORAL DAILY
Qty: 30 TABLET | Refills: 1 | Status: SHIPPED | OUTPATIENT
Start: 2023-01-13 | End: 2023-02-08

## 2023-02-08 DIAGNOSIS — E78.01 FAMILIAL HYPERCHOLESTEROLEMIA: ICD-10-CM

## 2023-02-08 DIAGNOSIS — E03.9 PRIMARY HYPOTHYROIDISM: ICD-10-CM

## 2023-02-08 RX ORDER — LIOTHYRONINE SODIUM 5 UG/1
TABLET ORAL
Qty: 45 TABLET | Refills: 2 | Status: SHIPPED | OUTPATIENT
Start: 2023-02-08 | End: 2023-05-22

## 2023-02-08 RX ORDER — EZETIMIBE 10 MG/1
10 TABLET ORAL DAILY
Qty: 30 TABLET | Refills: 1 | Status: SHIPPED | OUTPATIENT
Start: 2023-02-08 | End: 2023-02-27

## 2023-02-17 ENCOUNTER — APPOINTMENT (OUTPATIENT)
Dept: ENDOCRINOLOGY | Facility: MEDICAL CENTER | Age: 63
End: 2023-02-17
Payer: COMMERCIAL

## 2023-02-27 DIAGNOSIS — E78.01 FAMILIAL HYPERCHOLESTEROLEMIA: ICD-10-CM

## 2023-02-27 RX ORDER — EZETIMIBE 10 MG/1
10 TABLET ORAL DAILY
Qty: 90 TABLET | Refills: 1 | Status: SHIPPED | OUTPATIENT
Start: 2023-02-27

## 2023-03-07 ENCOUNTER — ANESTHESIA EVENT (OUTPATIENT)
Dept: RADIOLOGY | Facility: MEDICAL CENTER | Age: 63
End: 2023-03-07
Payer: COMMERCIAL

## 2023-03-08 ENCOUNTER — HOSPITAL ENCOUNTER (OUTPATIENT)
Dept: RADIOLOGY | Facility: MEDICAL CENTER | Age: 63
End: 2023-03-08
Attending: STUDENT IN AN ORGANIZED HEALTH CARE EDUCATION/TRAINING PROGRAM
Payer: COMMERCIAL

## 2023-03-08 ENCOUNTER — ANESTHESIA (OUTPATIENT)
Dept: RADIOLOGY | Facility: MEDICAL CENTER | Age: 63
End: 2023-03-08
Payer: COMMERCIAL

## 2023-03-08 VITALS
DIASTOLIC BLOOD PRESSURE: 60 MMHG | TEMPERATURE: 97.1 F | WEIGHT: 160.94 LBS | HEART RATE: 75 BPM | BODY MASS INDEX: 25.26 KG/M2 | OXYGEN SATURATION: 99 % | RESPIRATION RATE: 16 BRPM | HEIGHT: 67 IN | SYSTOLIC BLOOD PRESSURE: 118 MMHG

## 2023-03-08 DIAGNOSIS — M47.812 CERVICAL SPONDYLOSIS WITHOUT MYELOPATHY: ICD-10-CM

## 2023-03-08 PROCEDURE — 700111 HCHG RX REV CODE 636 W/ 250 OVERRIDE (IP): Performed by: STUDENT IN AN ORGANIZED HEALTH CARE EDUCATION/TRAINING PROGRAM

## 2023-03-08 PROCEDURE — 700105 HCHG RX REV CODE 258: Performed by: STUDENT IN AN ORGANIZED HEALTH CARE EDUCATION/TRAINING PROGRAM

## 2023-03-08 PROCEDURE — 4410588 MR-BRAIN-W/O

## 2023-03-08 PROCEDURE — 01922 ANES N-INVAS IMG/RADJ THER: CPT | Performed by: STUDENT IN AN ORGANIZED HEALTH CARE EDUCATION/TRAINING PROGRAM

## 2023-03-08 PROCEDURE — 700101 HCHG RX REV CODE 250: Performed by: STUDENT IN AN ORGANIZED HEALTH CARE EDUCATION/TRAINING PROGRAM

## 2023-03-08 RX ORDER — OXYCODONE HCL 5 MG/5 ML
10 SOLUTION, ORAL ORAL
Status: DISCONTINUED | OUTPATIENT
Start: 2023-03-08 | End: 2023-03-09 | Stop reason: HOSPADM

## 2023-03-08 RX ORDER — SODIUM CHLORIDE, SODIUM LACTATE, POTASSIUM CHLORIDE, CALCIUM CHLORIDE 600; 310; 30; 20 MG/100ML; MG/100ML; MG/100ML; MG/100ML
INJECTION, SOLUTION INTRAVENOUS CONTINUOUS
Status: DISCONTINUED | OUTPATIENT
Start: 2023-03-08 | End: 2023-03-09 | Stop reason: HOSPADM

## 2023-03-08 RX ORDER — HALOPERIDOL 5 MG/ML
1 INJECTION INTRAMUSCULAR
Status: DISCONTINUED | OUTPATIENT
Start: 2023-03-08 | End: 2023-03-09 | Stop reason: HOSPADM

## 2023-03-08 RX ORDER — MEPERIDINE HYDROCHLORIDE 25 MG/ML
12.5 INJECTION INTRAMUSCULAR; INTRAVENOUS; SUBCUTANEOUS
Status: DISCONTINUED | OUTPATIENT
Start: 2023-03-08 | End: 2023-03-09 | Stop reason: HOSPADM

## 2023-03-08 RX ORDER — LIDOCAINE HYDROCHLORIDE 20 MG/ML
INJECTION, SOLUTION EPIDURAL; INFILTRATION; INTRACAUDAL; PERINEURAL PRN
Status: DISCONTINUED | OUTPATIENT
Start: 2023-03-08 | End: 2023-03-08 | Stop reason: SURG

## 2023-03-08 RX ORDER — DIPHENHYDRAMINE HYDROCHLORIDE 50 MG/ML
12.5 INJECTION INTRAMUSCULAR; INTRAVENOUS
Status: DISCONTINUED | OUTPATIENT
Start: 2023-03-08 | End: 2023-03-09 | Stop reason: HOSPADM

## 2023-03-08 RX ORDER — HYDROMORPHONE HYDROCHLORIDE 1 MG/ML
0.1 INJECTION, SOLUTION INTRAMUSCULAR; INTRAVENOUS; SUBCUTANEOUS
Status: DISCONTINUED | OUTPATIENT
Start: 2023-03-08 | End: 2023-03-09 | Stop reason: HOSPADM

## 2023-03-08 RX ORDER — DEXAMETHASONE SODIUM PHOSPHATE 4 MG/ML
INJECTION, SOLUTION INTRA-ARTICULAR; INTRALESIONAL; INTRAMUSCULAR; INTRAVENOUS; SOFT TISSUE
Status: DISPENSED
Start: 2023-03-08 | End: 2023-03-08

## 2023-03-08 RX ORDER — OXYCODONE HCL 5 MG/5 ML
5 SOLUTION, ORAL ORAL
Status: DISCONTINUED | OUTPATIENT
Start: 2023-03-08 | End: 2023-03-09 | Stop reason: HOSPADM

## 2023-03-08 RX ORDER — LIDOCAINE HYDROCHLORIDE 20 MG/ML
INJECTION, SOLUTION INFILTRATION; PERINEURAL
Status: COMPLETED
Start: 2023-03-08 | End: 2023-03-08

## 2023-03-08 RX ORDER — SODIUM CHLORIDE, SODIUM LACTATE, POTASSIUM CHLORIDE, CALCIUM CHLORIDE 600; 310; 30; 20 MG/100ML; MG/100ML; MG/100ML; MG/100ML
INJECTION, SOLUTION INTRAVENOUS
Status: DISCONTINUED | OUTPATIENT
Start: 2023-03-08 | End: 2023-03-08 | Stop reason: SURG

## 2023-03-08 RX ORDER — HYDROMORPHONE HYDROCHLORIDE 1 MG/ML
0.4 INJECTION, SOLUTION INTRAMUSCULAR; INTRAVENOUS; SUBCUTANEOUS
Status: DISCONTINUED | OUTPATIENT
Start: 2023-03-08 | End: 2023-03-09 | Stop reason: HOSPADM

## 2023-03-08 RX ORDER — ONDANSETRON 2 MG/ML
4 INJECTION INTRAMUSCULAR; INTRAVENOUS
Status: DISCONTINUED | OUTPATIENT
Start: 2023-03-08 | End: 2023-03-09 | Stop reason: HOSPADM

## 2023-03-08 RX ORDER — HYDROMORPHONE HYDROCHLORIDE 1 MG/ML
0.2 INJECTION, SOLUTION INTRAMUSCULAR; INTRAVENOUS; SUBCUTANEOUS
Status: DISCONTINUED | OUTPATIENT
Start: 2023-03-08 | End: 2023-03-09 | Stop reason: HOSPADM

## 2023-03-08 RX ADMIN — PROPOFOL 150 MG: 10 INJECTION, EMULSION INTRAVENOUS at 07:52

## 2023-03-08 RX ADMIN — SODIUM CHLORIDE, POTASSIUM CHLORIDE, SODIUM LACTATE AND CALCIUM CHLORIDE: 600; 310; 30; 20 INJECTION, SOLUTION INTRAVENOUS at 07:50

## 2023-03-08 RX ADMIN — LIDOCAINE HYDROCHLORIDE 80 MG: 20 INJECTION, SOLUTION EPIDURAL; INFILTRATION; INTRACAUDAL at 07:52

## 2023-03-08 RX ADMIN — EPHEDRINE SULFATE 10 MG: 50 INJECTION INTRAMUSCULAR; INTRAVENOUS; SUBCUTANEOUS at 08:10

## 2023-03-08 RX ADMIN — EPHEDRINE SULFATE 10 MG: 50 INJECTION INTRAMUSCULAR; INTRAVENOUS; SUBCUTANEOUS at 08:16

## 2023-03-08 ASSESSMENT — FIBROSIS 4 INDEX
FIB4 SCORE: 0.88
FIB4 SCORE: 0.88

## 2023-03-08 ASSESSMENT — PAIN DESCRIPTION - PAIN TYPE: TYPE: CHRONIC PAIN

## 2023-03-08 NOTE — ANESTHESIA POSTPROCEDURE EVALUATION
Patient: Mari Sahni    Procedure Summary     Date: 03/08/23 Room / Location: [facility] MRI - [address]    Anesthesia Start: 0750 Anesthesia Stop: 0828    Procedure: MR-BRAIN-W/O Diagnosis: Cervical spondylosis without myelopathy    Scheduled Providers: Wesley Rivero M.D. Responsible Provider: Wesley Rivero M.D.    Anesthesia Type: general ASA Status: 2          Final Anesthesia Type: general  Last vitals  BP   Blood Pressure: 138/66    Temp   36.7 °C (98.1 °F)    Pulse   89   Resp   18    SpO2   100 %      Anesthesia Post Evaluation    Patient location during evaluation: PACU  Patient participation: complete - patient participated  Level of consciousness: awake and alert    Airway patency: patent  Anesthetic complications: no  Cardiovascular status: hemodynamically stable  Respiratory status: acceptable  Hydration status: euvolemic    PONV: none          No notable events documented.     Nurse Pain Score: 4 (NPRS)

## 2023-03-08 NOTE — PROGRESS NOTES
Patient to MRI outpatient dept. Patient informed process and plan of care during this visit.  Anesthesiologist Dr. Rivero spoke with Patient and discussed provider's plan of care.  Consent obtained.  MRI completed without incident.  Patient tolerated clear liquids once awake, alert, and appropriate.  DC instructions discussed with Patient and .  Questions encouraged and answered.  Defer to Provider for further instruction.  Patient discharged in stable condition to responsible adult.

## 2023-03-08 NOTE — ANESTHESIA PROCEDURE NOTES
Airway    Date/Time: 3/8/2023 7:55 AM  Performed by: Wesley Rivero M.D.  Authorized by: Wesley Rivero M.D.     Location:  OR  Urgency:  Elective  Indications for Airway Management:  Anesthesia      Spontaneous Ventilation: absent    Sedation Level:  Deep  Preoxygenated: Yes    Mask Difficulty Assessment:  1 - vent by mask  Final Airway Type:  Supraglottic airway  Final Supraglottic Airway:  Standard LMA    SGA Size:  3  Number of Attempts at Approach:  1

## 2023-03-08 NOTE — ANESTHESIA PREPROCEDURE EVALUATION
Date/Time: 03/08/23 0745    Procedure: MR-BRAIN-W/O    Diagnosis: Cervical spondylosis without myelopathy [M47.812]    Location: [facility] - MRI - [address]        63 yo F w/ h/o PONV  Relevant Problems   ANESTHESIA   (positive) PONV (postoperative nausea and vomiting)      ENDO   (positive) Primary hypothyroidism       Physical Exam    Airway   Mallampati: II  TM distance: >3 FB  Neck ROM: full       Cardiovascular - normal exam  Rhythm: regular  Rate: normal  (-) murmur     Dental - normal exam           Pulmonary - normal exam  Breath sounds clear to auscultation     Abdominal    Neurological - normal exam                 Anesthesia Plan    ASA 2       Plan - general       Airway plan will be LMA          Induction: intravenous    Postoperative Plan: Postoperative administration of opioids is intended.    Pertinent diagnostic labs and testing reviewed    Informed Consent:    Anesthetic plan and risks discussed with patient.    Use of blood products discussed with: patient whom consented to blood products.

## 2023-03-08 NOTE — ANESTHESIA TIME REPORT
Anesthesia Start and Stop Event Times     Date Time Event    3/8/2023 0735 Ready for Procedure     0750 Anesthesia Start     0828 Anesthesia Stop        Responsible Staff  03/08/23    Name Role Begin End    Wesley Rivero M.D. Anesth 0750 0828        Overtime Reason:  no overtime (within assigned shift)    Comments:

## 2023-03-28 ENCOUNTER — APPOINTMENT (OUTPATIENT)
Dept: ENDOCRINOLOGY | Facility: MEDICAL CENTER | Age: 63
End: 2023-03-28
Payer: COMMERCIAL

## 2023-03-28 NOTE — PROGRESS NOTES
Chief Complaint: Follow up for Primary Hypothyroidism    HPI:     Mari Sahni is a 62 y.o. female here for follow up of Primary Hypothyroidism.  Since last visit patient reports feeling {GOOD:01817}.  She remains on {MGTHYROIDMED (Optional):23499} *** daily which has been {HIS/HER:04517} dose for the past {NUMBER 1-12:04936} {Weeks, months, years:11654}.   She reports {GOOD:58648} compliance and {DENIES:65276} missing any daily doses.   She takes thyroid hormone {Desc; before/after:30545} breakfast.   She  {DENIES:24039} taking any iron, calcium supplements or antacids.      Weight {Weight change:42709}    She currently {DENIES:80923} {THYROID SIGNS AND SYMPTOMS:76605}.     She currently {DENIES:21420} {THYROID SIGNS AND SYMPTOMS:12014}.        Latest Reference Range & Units 01/09/23 06:24   TSH 0.380 - 5.330 uIU/mL 0.260 (L)   Free T-4 0.93 - 1.70 ng/dL 1.06   T3,Free 2.00 - 4.40 pg/mL 2.49         Patient's medications, allergies, and social histories were reviewed and updated as appropriate.      ROS:     CONS:     No fever, no chills   EYES:     No diplopia, no blurry vision   CV:           No chest pain, no palpitations   PULM:     No SOB, no cough, no hemoptysis.   GI:            No nausea, no vomiting, no diarrhea, no constipation   ENDO:     No polyuria, no polydipsia, no heat intolerance, no cold intolerance       Past Medical History:  Problem List:  2022-12: Familial hypercholesterolemia  2022-12: Vitamin D deficiency  2022-12: Primary hypothyroidism  2022-11: PONV (postoperative nausea and vomiting)  2022-10: Traumatic tear of right rotator cuff, initial encounter  2022-10: Biceps tendonitis on right  2022-10: Glenoid labral tear, right, initial encounter  2022-10: Subacromial impingement of right shoulder      Past Surgical History:  Past Surgical History:   Procedure Laterality Date    PB SHLDR ARTHROSCOP,SURG,W/ROTAT CUFF REPB Right 11/14/2022    Procedure: Right Shoulder arthroscopy with rotator  cuff repair including subscapularis, biceps tenodesis, subacromial decrompression, labral debridment, repairs as indicated;  Surgeon: Oxana Medel M.D.;  Location: SURGERY [facility];  Service: Orthopedics    PB ARTHROSCOPY SHOULDER SURGICAL BICEPS TENODES* Right 2022    Procedure: ARTHROSCOPY, SHOULDER, WITH BICEPS TENODESIS;  Surgeon: Oxana Medel M.D.;  Location: SURGERY [facility];  Service: Orthopedics    [place] SHLDR ARTHROSCOP,PART ACROMIOPLAS Right 2022    Procedure: DECOMPRESSION, SHOULDER, ARTHROSCOPIC;  Surgeon: Oxana Medel M.D.;  Location: SURGERY [facility];  Service: Orthopedics    MN SHLDR ARTHROSCOP EXTEN DEBRIDE 3+ Right 2022    Procedure: ARTHROSCOPY, SHOULDER, WITH EXTENSIVE DEBRIDEMENT;  Surgeon: Oxana Medel M.D.;  Location: SURGERY [facility];  Service: Orthopedics    OTHER ORTHOPEDIC SURGERY      back pain x 6 months    MN BREAST AUGMENTATION WITH IMPLANT          Allergies:  Codeine and Pcn [penicillins]     Social History:  Social History     Tobacco Use    Smoking status: Former     Packs/day: 0.75     Years: 15.00     Pack years: 11.25     Types: Cigarettes     Quit date:      Years since quittin.2    Smokeless tobacco: Never    Tobacco comments:     QUIT 20+ YEARS AGO - PER PT, 2022   Vaping Use    Vaping Use: Never used   Substance Use Topics    Alcohol use: Yes     Comment: 3-4X WEEKLY        Family History:   family history includes Arthritis in her father; Diabetes in her mother; Heart Attack in her paternal grandfather; Heart Disease in her mother; Hyperlipidemia in her father, mother, and sister; Hypertension in her mother and sister; Other in her father.      PHYSICAL EXAM:   Vital signs: There were no vitals taken for this visit.  GENERAL: Well-developed, well-nourished in no apparent distress.   EYE:  No ocular asymmetry, PERRLA  HENT: Pink, moist mucous membranes.    NECK: No thyromegaly.   CARDIOVASCULAR:  No  murmurs  LUNGS: Clear breath sounds  ABDOMEN: Soft, nontender   EXTREMITIES: No clubbing, cyanosis, or edema.   NEUROLOGICAL: No gross focal motor abnormalities   LYMPH: No cervical adenopathy palpated.   SKIN: No rashes, lesions.       Labs:  Lab Results   Component Value Date/Time    SODIUM 138 07/25/2022 07:39 AM    POTASSIUM 3.9 07/25/2022 07:39 AM    CHLORIDE 102 07/25/2022 07:39 AM    CO2 27 07/25/2022 07:39 AM    ANION 9.0 07/25/2022 07:39 AM    GLUCOSE 94 07/25/2022 07:39 AM    BUN 28 (H) 07/25/2022 07:39 AM    CREATININE 0.70 07/25/2022 07:39 AM    CALCIUM 9.3 07/25/2022 07:39 AM    ASTSGOT 20 07/25/2022 07:39 AM    ALTSGPT 12 07/25/2022 07:39 AM    TBILIRUBIN 0.4 07/25/2022 07:39 AM    ALBUMIN 4.3 07/25/2022 07:39 AM    TOTPROTEIN 6.8 07/25/2022 07:39 AM    GLOBULIN 2.5 07/25/2022 07:39 AM    AGRATIO 1.7 07/25/2022 07:39 AM       Lab Results   Component Value Date/Time    SODIUM 138 07/25/2022 0739    POTASSIUM 3.9 07/25/2022 0739    CHLORIDE 102 07/25/2022 0739    CO2 27 07/25/2022 0739    GLUCOSE 94 07/25/2022 0739    BUN 28 (H) 07/25/2022 0739    CREATININE 0.70 07/25/2022 0739    CALCIUM 9.3 07/25/2022 0739    ANION 9.0 07/25/2022 0739       Lab Results   Component Value Date/Time    CHOLSTRLTOT 240 (H) 01/09/2023 0624    TRIGLYCERIDE 118 01/09/2023 0624    HDL 65 01/09/2023 0624     (H) 01/09/2023 0624       Lab Results   Component Value Date/Time    TSHULTRASEN 0.260 (L) 01/09/2023 0624     Lab Results   Component Value Date/Time    FREET4 1.06 01/09/2023 0624     Lab Results   Component Value Date/Time    FREET3 2.49 01/09/2023 0624     No results found for: THYSTIMIG    No results found for: MICROSOMALA      Imaging:      ASSESSMENT/PLAN:     There are no diagnoses linked to this encounter.    No follow-ups on file.    ***  This patient during there office visit today was started on a new medication.  Side effects of the new medication were discussed with the patient today in the office.      Thank you kindly for allowing me to participate in the thyroid care plan for this patient.    Emmanuel Ruiz MD, [facility], [facility]  03/28/23    CC:   JOJO Schuster

## 2023-05-17 ENCOUNTER — HOSPITAL ENCOUNTER (OUTPATIENT)
Dept: LAB | Facility: MEDICAL CENTER | Age: 63
End: 2023-05-17
Payer: COMMERCIAL

## 2023-05-17 LAB
25(OH)D3 SERPL-MCNC: 53 NG/ML (ref 30–100)
ALBUMIN SERPL BCP-MCNC: 4.3 G/DL (ref 3.2–4.9)
ALBUMIN/GLOB SERPL: 1.5 G/DL
ALP SERPL-CCNC: 123 U/L (ref 30–99)
ALT SERPL-CCNC: 10 U/L (ref 2–50)
ANION GAP SERPL CALC-SCNC: 11 MMOL/L (ref 7–16)
AST SERPL-CCNC: 18 U/L (ref 12–45)
BASOPHILS # BLD AUTO: 1.2 % (ref 0–1.8)
BASOPHILS # BLD: 0.08 K/UL (ref 0–0.12)
BILIRUB SERPL-MCNC: 0.3 MG/DL (ref 0.1–1.5)
BUN SERPL-MCNC: 19 MG/DL (ref 8–22)
BURR CELLS BLD QL SMEAR: NORMAL
CALCIUM ALBUM COR SERPL-MCNC: 9 MG/DL (ref 8.5–10.5)
CALCIUM SERPL-MCNC: 9.2 MG/DL (ref 8.5–10.5)
CHLORIDE SERPL-SCNC: 107 MMOL/L (ref 96–112)
CHOLEST SERPL-MCNC: 294 MG/DL (ref 100–199)
CO2 SERPL-SCNC: 26 MMOL/L (ref 20–33)
COMMENT 1642: NORMAL
CREAT SERPL-MCNC: 0.8 MG/DL (ref 0.5–1.4)
EOSINOPHIL # BLD AUTO: 0.2 K/UL (ref 0–0.51)
EOSINOPHIL NFR BLD: 3 % (ref 0–6.9)
ERYTHROCYTE [DISTWIDTH] IN BLOOD BY AUTOMATED COUNT: 53.9 FL (ref 35.9–50)
FASTING STATUS PATIENT QL REPORTED: NORMAL
GFR SERPLBLD CREATININE-BSD FMLA CKD-EPI: 84 ML/MIN/1.73 M 2
GLOBULIN SER CALC-MCNC: 2.8 G/DL (ref 1.9–3.5)
GLUCOSE SERPL-MCNC: 93 MG/DL (ref 65–99)
HCT VFR BLD AUTO: 37.4 % (ref 37–47)
HDLC SERPL-MCNC: 59 MG/DL
HGB BLD-MCNC: 10.9 G/DL (ref 12–16)
IMM GRANULOCYTES # BLD AUTO: 0.01 K/UL (ref 0–0.11)
IMM GRANULOCYTES NFR BLD AUTO: 0.2 % (ref 0–0.9)
LDLC SERPL CALC-MCNC: 191 MG/DL
LYMPHOCYTES # BLD AUTO: 3.17 K/UL (ref 1–4.8)
LYMPHOCYTES NFR BLD: 47.9 % (ref 22–41)
MCH RBC QN AUTO: 24.7 PG (ref 27–33)
MCHC RBC AUTO-ENTMCNC: 29.1 G/DL (ref 33.6–35)
MCV RBC AUTO: 84.8 FL (ref 81.4–97.8)
MONOCYTES # BLD AUTO: 0.6 K/UL (ref 0–0.85)
MONOCYTES NFR BLD AUTO: 9.1 % (ref 0–13.4)
MORPHOLOGY BLD-IMP: NORMAL
NEUTROPHILS # BLD AUTO: 2.56 K/UL (ref 2–7.15)
NEUTROPHILS NFR BLD: 38.6 % (ref 44–72)
NRBC # BLD AUTO: 0 K/UL
NRBC BLD-RTO: 0 /100 WBC
PLATELET # BLD AUTO: 469 K/UL (ref 164–446)
PLATELET BLD QL SMEAR: NORMAL
PMV BLD AUTO: 10.2 FL (ref 9–12.9)
POIKILOCYTOSIS BLD QL SMEAR: NORMAL
POTASSIUM SERPL-SCNC: 4.4 MMOL/L (ref 3.6–5.5)
PROT SERPL-MCNC: 7.1 G/DL (ref 6–8.2)
RBC # BLD AUTO: 4.41 M/UL (ref 4.2–5.4)
RBC BLD AUTO: PRESENT
SODIUM SERPL-SCNC: 144 MMOL/L (ref 135–145)
T3FREE SERPL-MCNC: 2.83 PG/ML (ref 2–4.4)
T4 FREE SERPL-MCNC: 1.13 NG/DL (ref 0.93–1.7)
TRIGL SERPL-MCNC: 220 MG/DL (ref 0–149)
TSH SERPL DL<=0.005 MIU/L-ACNC: 0.5 UIU/ML (ref 0.38–5.33)
WBC # BLD AUTO: 6.6 K/UL (ref 4.8–10.8)

## 2023-05-17 PROCEDURE — 84439 ASSAY OF FREE THYROXINE: CPT

## 2023-05-17 PROCEDURE — 84443 ASSAY THYROID STIM HORMONE: CPT

## 2023-05-17 PROCEDURE — 82306 VITAMIN D 25 HYDROXY: CPT

## 2023-05-17 PROCEDURE — 85025 COMPLETE CBC W/AUTO DIFF WBC: CPT

## 2023-05-17 PROCEDURE — 80053 COMPREHEN METABOLIC PANEL: CPT

## 2023-05-17 PROCEDURE — 80061 LIPID PANEL: CPT

## 2023-05-17 PROCEDURE — 84481 FREE ASSAY (FT-3): CPT

## 2023-05-17 PROCEDURE — 36415 COLL VENOUS BLD VENIPUNCTURE: CPT

## 2023-05-20 DIAGNOSIS — E03.9 PRIMARY HYPOTHYROIDISM: ICD-10-CM

## 2023-05-22 RX ORDER — LIOTHYRONINE SODIUM 5 UG/1
TABLET ORAL
Qty: 135 TABLET | Refills: 1 | Status: SHIPPED | OUTPATIENT
Start: 2023-05-22 | End: 2023-10-16

## 2023-05-30 ENCOUNTER — OFFICE VISIT (OUTPATIENT)
Dept: ENDOCRINOLOGY | Facility: MEDICAL CENTER | Age: 63
End: 2023-05-30
Payer: COMMERCIAL

## 2023-05-30 VITALS
HEIGHT: 67 IN | OXYGEN SATURATION: 98 % | WEIGHT: 171 LBS | BODY MASS INDEX: 26.84 KG/M2 | SYSTOLIC BLOOD PRESSURE: 120 MMHG | DIASTOLIC BLOOD PRESSURE: 88 MMHG | HEART RATE: 102 BPM

## 2023-05-30 DIAGNOSIS — R53.83 FATIGUE, UNSPECIFIED TYPE: ICD-10-CM

## 2023-05-30 DIAGNOSIS — E78.01 FAMILIAL HYPERCHOLESTEROLEMIA: ICD-10-CM

## 2023-05-30 DIAGNOSIS — E03.9 PRIMARY HYPOTHYROIDISM: ICD-10-CM

## 2023-05-30 DIAGNOSIS — E55.9 VITAMIN D DEFICIENCY: ICD-10-CM

## 2023-05-30 PROCEDURE — 99211 OFF/OP EST MAY X REQ PHY/QHP: CPT

## 2023-05-30 PROCEDURE — 99214 OFFICE O/P EST MOD 30 MIN: CPT

## 2023-05-30 PROCEDURE — 3079F DIAST BP 80-89 MM HG: CPT

## 2023-05-30 PROCEDURE — 3074F SYST BP LT 130 MM HG: CPT

## 2023-05-30 ASSESSMENT — FIBROSIS 4 INDEX: FIB4 SCORE: 0.73

## 2023-05-30 NOTE — PROGRESS NOTES
Chief Complaint: Follow up for Primary Hypothyroidism    HPI:     Mari Sahni is a 62 y.o. female here for follow up of Primary Hypothyroidism.  Since last visit patient reports feeling sluggish.      Hypothyroidism  She remains on Levothyroxine 112 mcg and cytomel 5 mcg in the morning in the evening  which has been her dose for the past 4 month.     She reports good compliance and denies missing any daily doses.      She takes thyroid hormone prior to breakfast.       She  denies taking any iron, calcium supplements or antacids.      She reports taking multivitamin    Weight has increased 10 pounds over last 6 months    She currently reports fatigue, weight gain, anxiety   She currently denies feeling cold and cold intolerance, constipation, swelling, anxiousness, feeling excessive energy, tremulousness, palpitations, sweating, weight loss, diarrhea, change in skin,  nails, or hair, heat intolerance, depression, goiter, ocular symptoms, amenorrhea.        Latest Reference Range & Units 05/17/23 06:53   TSH 0.380 - 5.330 uIU/mL 0.500   Free T-4 0.93 - 1.70 ng/dL 1.13   T3,Free 2.00 - 4.40 pg/mL 2.83     2. Hyperlipidemia  Zetia 10mg daily - stopped   Unable to tolerate due to muscle aches   Latest Reference Range & Units 05/17/23 06:53   Cholesterol,Tot 100 - 199 mg/dL 294 (H)   Triglycerides 0 - 149 mg/dL 220 (H)   HDL >=40 mg/dL 59   LDL <100 mg/dL 191 (H)     3. Vitamin D deficiency  Currently taking 4000IU daily   Latest Reference Range & Units 05/17/23 06:53   25-Hydroxy   Vitamin D 25 30 - 100 ng/mL 53     Patient's medications, allergies, and social histories were reviewed and updated as appropriate.      ROS:     CONS:     No fever, no chills   EYES:     No diplopia, no blurry vision   CV:           No chest pain, no palpitations   PULM:     No SOB, no cough, no hemoptysis.   GI:            No nausea, no vomiting, no diarrhea, no constipation   ENDO:     No polyuria, no polydipsia, no heat intolerance,  no cold intolerance       Past Medical History:  Problem List:  2022: Familial hypercholesterolemia  2022: Vitamin D deficiency  2022: Primary hypothyroidism  2022: PONV (postoperative nausea and vomiting)  2022-10: Traumatic tear of right rotator cuff, initial encounter  2022-10: Biceps tendonitis on right  2022-10: Glenoid labral tear, right, initial encounter  2022-10: Subacromial impingement of right shoulder      Past Surgical History:  Past Surgical History:   Procedure Laterality Date    PB SHLDR ARTHROSCOP,SURG,W/ROTAT CUFF REPB Right 2022    Procedure: Right Shoulder arthroscopy with rotator cuff repair including subscapularis, biceps tenodesis, subacromial decrompression, labral debridment, repairs as indicated;  Surgeon: Oxana Medel M.D.;  Location: [facility];  Service: Orthopedics    PB ARTHROSCOPY SHOULDER SURGICAL BICEPS TENODES* Right 2022    Procedure: ARTHROSCOPY, SHOULDER, WITH BICEPS TENODESIS;  Surgeon: Oxana Medel M.D.;  Location: [facility];  Service: Orthopedics    [facility]    Procedure: DECOMPRESSION, SHOULDER, ARTHROSCOPIC;  Surgeon: Oxana Medel M.D.;  Location: [facility];  Service: Orthopedics    [facility]    Procedure: ARTHROSCOPY, SHOULDER, WITH EXTENSIVE DEBRIDEMENT;  Surgeon: Oxana Medel M.D.;  Location: [facility];  Service: Orthopedics    OTHER ORTHOPEDIC SURGERY      back pain x 6 months    AL BREAST AUGMENTATION WITH IMPLANT          Allergies:  Codeine and Pcn [penicillins]     Social History:  Social History     Tobacco Use    Smoking status: Former     Packs/day: 0.75     Years: 15.00     Pack years: 11.25     Types: Cigarettes     Quit date:      Years since quittin.4    Smokeless tobacco: Never    Tobacco comments:     QUIT 20+ YEARS AGO - PER PT, 2022   Vaping Use    Vaping  "Use: Never used   Substance Use Topics    Alcohol use: Yes     Comment: 3-4X WEEKLY        Family History:   family history includes Arthritis in her father; Diabetes in her mother; Heart Attack in her paternal grandfather; Heart Disease in her mother; Hyperlipidemia in her father, mother, and sister; Hypertension in her mother and sister; Other in her father.      PHYSICAL EXAM:   Vital signs: Pulse (!) 102   Ht 1.702 m (5' 7\")   Wt 77.6 kg (171 lb)   SpO2 98%   BMI 26.78 kg/m²   GENERAL: Well-developed, well-nourished in no apparent distress.   EYE:  No ocular asymmetry, PERRLA  HENT: Pink, moist mucous membranes.    NECK: No thyromegaly.   CARDIOVASCULAR:  No murmurs  LUNGS: Clear breath sounds  ABDOMEN: Soft, nontender   EXTREMITIES: No clubbing, cyanosis, or edema.   NEUROLOGICAL: No gross focal motor abnormalities   LYMPH: No cervical adenopathy palpated.   SKIN: No rashes, lesions.       Labs:  Lab Results   Component Value Date/Time    SODIUM 144 05/17/2023 06:53 AM    POTASSIUM 4.4 05/17/2023 06:53 AM    CHLORIDE 107 05/17/2023 06:53 AM    CO2 26 05/17/2023 06:53 AM    ANION 11.0 05/17/2023 06:53 AM    GLUCOSE 93 05/17/2023 06:53 AM    BUN 19 05/17/2023 06:53 AM    CREATININE 0.80 05/17/2023 06:53 AM    CALCIUM 9.2 05/17/2023 06:53 AM    ASTSGOT 18 05/17/2023 06:53 AM    ALTSGPT 10 05/17/2023 06:53 AM    TBILIRUBIN 0.3 05/17/2023 06:53 AM    ALBUMIN 4.3 05/17/2023 06:53 AM    TOTPROTEIN 7.1 05/17/2023 06:53 AM    GLOBULIN 2.8 05/17/2023 06:53 AM    AGRATIO 1.5 05/17/2023 06:53 AM       Lab Results   Component Value Date/Time    SODIUM 144 05/17/2023 0653    POTASSIUM 4.4 05/17/2023 0653    CHLORIDE 107 05/17/2023 0653    CO2 26 05/17/2023 0653    GLUCOSE 93 05/17/2023 0653    BUN 19 05/17/2023 0653    CREATININE 0.80 05/17/2023 0653    CALCIUM 9.2 05/17/2023 0653    ANION 11.0 05/17/2023 0653       Lab Results   Component Value Date/Time    CHOLSTRLTOT 294 (H) 05/17/2023 0653    TRIGLYCERIDE 220 (H) " 05/17/2023 0653    HDL 59 05/17/2023 0653     (H) 05/17/2023 0653       Lab Results   Component Value Date/Time    TSHULTRASEN 0.500 05/17/2023 0653     Lab Results   Component Value Date/Time    FREET4 1.13 05/17/2023 0653     Lab Results   Component Value Date/Time    FREET3 2.83 05/17/2023 0653     No results found for: THYSTIMIG    No results found for: MICROSOMALA      Imaging:      ASSESSMENT/PLAN:   1. Primary hypothyroidism  Unstable  Medication  Levothyroxine 112 mcg - continue  Cytomel 5cmg daily - change to cytomel 5 mcg in the morning and 2.5mcg in the afternoon  Patient understands Take your thyroid medication daily as discussed at visit.  Thyroid hormone should be taken first in the morning 30 minutes before eating.   Wait and take any iron supplements or multivitamins containing iron at least 6 hours after levothyroxine dose.  Patient understands to stop taking multivitamin 5 days prior to blood work   - FREE THYROXINE; Future  - TSH; Future  - T3 FREE; Future    2. Familial hypercholesterolemia  Unstable  Patient stopped taking zetia due to intolerance from muscle aches.   Recommend diet low in fats, and carbs  Recommend exercising 30 mins daily    3. Vitamin D deficiency  Stable  Continue regimen - see HPI  - VITAMIN D,25 HYDROXY (DEFICIENCY); Future    4. Fatigue, unspecified type  Unstable  We will do additional blood work to determine cause of fatigue  - VITAMIN B12; Future  - FOLATE; Future  - IRON/TOTAL IRON BIND; Future  - Comp Metabolic Panel; Future     Return in about 3 months (around 8/30/2023). Patient will have blood work done 2 weeks prior to next apt in 3 months.     Thank you kindly for allowing me to participate in the thyroid care plan for this patient.    Duke Espinosa, APRN   05/30/23    CC:   JOJO Schuster

## 2023-08-23 ENCOUNTER — TELEPHONE (OUTPATIENT)
Dept: ENDOCRINOLOGY | Facility: MEDICAL CENTER | Age: 63
End: 2023-08-23
Payer: COMMERCIAL

## 2023-08-24 ENCOUNTER — HOSPITAL ENCOUNTER (OUTPATIENT)
Dept: LAB | Facility: MEDICAL CENTER | Age: 63
End: 2023-08-24
Attending: NURSE PRACTITIONER
Payer: COMMERCIAL

## 2023-08-24 LAB
25(OH)D3 SERPL-MCNC: 66 NG/ML (ref 30–100)
ALBUMIN SERPL BCP-MCNC: 4.5 G/DL (ref 3.2–4.9)
ALBUMIN/GLOB SERPL: 1.7 G/DL
ALP SERPL-CCNC: 114 U/L (ref 30–99)
ALT SERPL-CCNC: 17 U/L (ref 2–50)
ANION GAP SERPL CALC-SCNC: 11 MMOL/L (ref 7–16)
AST SERPL-CCNC: 23 U/L (ref 12–45)
BASOPHILS # BLD AUTO: 1 % (ref 0–1.8)
BASOPHILS # BLD: 0.06 K/UL (ref 0–0.12)
BILIRUB SERPL-MCNC: 0.2 MG/DL (ref 0.1–1.5)
BUN SERPL-MCNC: 17 MG/DL (ref 8–22)
CALCIUM ALBUM COR SERPL-MCNC: 8.7 MG/DL (ref 8.5–10.5)
CALCIUM SERPL-MCNC: 9.1 MG/DL (ref 8.5–10.5)
CHLORIDE SERPL-SCNC: 104 MMOL/L (ref 96–112)
CHOLEST SERPL-MCNC: 259 MG/DL (ref 100–199)
CO2 SERPL-SCNC: 26 MMOL/L (ref 20–33)
CREAT SERPL-MCNC: 0.66 MG/DL (ref 0.5–1.4)
EOSINOPHIL # BLD AUTO: 0.21 K/UL (ref 0–0.51)
EOSINOPHIL NFR BLD: 3.4 % (ref 0–6.9)
ERYTHROCYTE [DISTWIDTH] IN BLOOD BY AUTOMATED COUNT: 51.3 FL (ref 35.9–50)
FASTING STATUS PATIENT QL REPORTED: NORMAL
GFR SERPLBLD CREATININE-BSD FMLA CKD-EPI: 100 ML/MIN/1.73 M 2
GLOBULIN SER CALC-MCNC: 2.6 G/DL (ref 1.9–3.5)
GLUCOSE SERPL-MCNC: 108 MG/DL (ref 65–99)
HCT VFR BLD AUTO: 38.8 % (ref 37–47)
HDLC SERPL-MCNC: 65 MG/DL
HGB BLD-MCNC: 11.7 G/DL (ref 12–16)
IMM GRANULOCYTES # BLD AUTO: 0.01 K/UL (ref 0–0.11)
IMM GRANULOCYTES NFR BLD AUTO: 0.2 % (ref 0–0.9)
LDLC SERPL CALC-MCNC: 166 MG/DL
LYMPHOCYTES # BLD AUTO: 2.84 K/UL (ref 1–4.8)
LYMPHOCYTES NFR BLD: 45.9 % (ref 22–41)
MCH RBC QN AUTO: 24.9 PG (ref 27–33)
MCHC RBC AUTO-ENTMCNC: 30.2 G/DL (ref 32.2–35.5)
MCV RBC AUTO: 82.6 FL (ref 81.4–97.8)
MONOCYTES # BLD AUTO: 0.62 K/UL (ref 0–0.85)
MONOCYTES NFR BLD AUTO: 10 % (ref 0–13.4)
NEUTROPHILS # BLD AUTO: 2.45 K/UL (ref 1.82–7.42)
NEUTROPHILS NFR BLD: 39.5 % (ref 44–72)
NRBC # BLD AUTO: 0 K/UL
NRBC BLD-RTO: 0 /100 WBC (ref 0–0.2)
PLATELET # BLD AUTO: 458 K/UL (ref 164–446)
PMV BLD AUTO: 10 FL (ref 9–12.9)
POTASSIUM SERPL-SCNC: 4.3 MMOL/L (ref 3.6–5.5)
PROT SERPL-MCNC: 7.1 G/DL (ref 6–8.2)
RBC # BLD AUTO: 4.7 M/UL (ref 4.2–5.4)
SODIUM SERPL-SCNC: 141 MMOL/L (ref 135–145)
T3FREE SERPL-MCNC: 3.04 PG/ML (ref 2–4.4)
T4 FREE SERPL-MCNC: 1.22 NG/DL (ref 0.93–1.7)
TRIGL SERPL-MCNC: 138 MG/DL (ref 0–149)
TSH SERPL DL<=0.005 MIU/L-ACNC: 0.19 UIU/ML (ref 0.38–5.33)
WBC # BLD AUTO: 6.2 K/UL (ref 4.8–10.8)

## 2023-08-24 PROCEDURE — 84443 ASSAY THYROID STIM HORMONE: CPT

## 2023-08-24 PROCEDURE — 85025 COMPLETE CBC W/AUTO DIFF WBC: CPT

## 2023-08-24 PROCEDURE — 36415 COLL VENOUS BLD VENIPUNCTURE: CPT

## 2023-08-24 PROCEDURE — 84439 ASSAY OF FREE THYROXINE: CPT

## 2023-08-24 PROCEDURE — 84481 FREE ASSAY (FT-3): CPT

## 2023-08-24 PROCEDURE — 80053 COMPREHEN METABOLIC PANEL: CPT

## 2023-08-24 PROCEDURE — 82306 VITAMIN D 25 HYDROXY: CPT

## 2023-08-24 PROCEDURE — 80061 LIPID PANEL: CPT

## 2023-08-24 PROCEDURE — 86800 THYROGLOBULIN ANTIBODY: CPT

## 2023-08-26 LAB — THYROGLOB AB SERPL-ACNC: <0.9 IU/ML (ref 0–4)

## 2023-08-30 ENCOUNTER — OFFICE VISIT (OUTPATIENT)
Dept: ENDOCRINOLOGY | Facility: MEDICAL CENTER | Age: 63
End: 2023-08-30
Payer: COMMERCIAL

## 2023-08-30 VITALS
HEIGHT: 67 IN | OXYGEN SATURATION: 100 % | WEIGHT: 174.6 LBS | BODY MASS INDEX: 27.4 KG/M2 | DIASTOLIC BLOOD PRESSURE: 86 MMHG | SYSTOLIC BLOOD PRESSURE: 134 MMHG | HEART RATE: 87 BPM

## 2023-08-30 DIAGNOSIS — R53.83 FATIGUE, UNSPECIFIED TYPE: ICD-10-CM

## 2023-08-30 DIAGNOSIS — E03.9 PRIMARY HYPOTHYROIDISM: ICD-10-CM

## 2023-08-30 DIAGNOSIS — R73.03 PREDIABETES: ICD-10-CM

## 2023-08-30 DIAGNOSIS — E55.9 VITAMIN D DEFICIENCY: ICD-10-CM

## 2023-08-30 DIAGNOSIS — E78.01 FAMILIAL HYPERCHOLESTEROLEMIA: ICD-10-CM

## 2023-08-30 PROCEDURE — 99214 OFFICE O/P EST MOD 30 MIN: CPT

## 2023-08-30 PROCEDURE — 3079F DIAST BP 80-89 MM HG: CPT

## 2023-08-30 PROCEDURE — 3075F SYST BP GE 130 - 139MM HG: CPT

## 2023-08-30 PROCEDURE — 99211 OFF/OP EST MAY X REQ PHY/QHP: CPT

## 2023-08-30 RX ORDER — DULOXETIN HYDROCHLORIDE 60 MG/1
60 CAPSULE, DELAYED RELEASE ORAL 2 TIMES DAILY
COMMUNITY
Start: 2023-08-04

## 2023-08-30 ASSESSMENT — FIBROSIS 4 INDEX: FIB4 SCORE: 0.73

## 2023-08-30 NOTE — PROGRESS NOTES
Chief Complaint: Follow up for Primary Hypothyroidism    HPI:     Mari Sahni is a 62 y.o. female here for follow up of Primary Hypothyroidism.  Since last visit patient reports feeling better.      Hypothyroidism  She remains on Levothyroxine 112 mcg and cytomel 5 mcg in the morning and 5 mcg in the afternoon(just   which has been her dose for the past 4 month.     She reports good compliance and denies missing any daily doses.      She takes thyroid hormone prior to breakfast.       She  denies taking any iron, calcium supplements or antacids.      She reports taking multivitamin    Weight has increased 10 pounds over last 6 months    She currently reports fatigue- better, weight gain-same, anxiety-better  Today she reports cold intolerance     She currently denies feeling cold and cold intolerance, constipation, swelling, anxiousness, feeling excessive energy, tremulousness, palpitations, sweating, weight loss, diarrhea, change in skin,  nails, or hair, heat intolerance, depression, goiter, ocular symptoms, amenorrhea.        Latest Reference Range & Units 08/24/23 06:49   TSH 0.380 - 5.330 uIU/mL 0.190 (L)   Free T-4 0.93 - 1.70 ng/dL 1.22   T3,Free 2.00 - 4.40 pg/mL 3.04       2. Hyperlipidemia  Zetia 10mg daily - stopped   Unable to tolerate due to muscle aches   Latest Reference Range & Units 08/24/23 06:49   Cholesterol,Tot 100 - 199 mg/dL 259 (H)   Triglycerides 0 - 149 mg/dL 138   HDL >=40 mg/dL 65   LDL <100 mg/dL 166 (H)       3. Vitamin D deficiency  Currently taking 4000IU daily   Latest Reference Range & Units 08/24/23 06:49   25-Hydroxy   Vitamin D 25 30 - 100 ng/mL 66     4. Prediabetes  POC A1C in clinic today is 5.5   Latest Reference Range & Units 03/06/19 07:40   Glycohemoglobin 0.0 - 5.6 % 5.8 (H)   Diet: generally healthy  Activity: piliates 2 times a week  BMI: 27.35  Has gained 14lbs since Nov 2022    Patient's medications, allergies, and social histories were reviewed and updated as  appropriate.      ROS:     CONS:     No fever, no chills   EYES:     No diplopia, no blurry vision   CV:           No chest pain, no palpitations   PULM:     No SOB, no cough, no hemoptysis.   GI:            No nausea, no vomiting, no diarrhea, no constipation   ENDO:     No polyuria, no polydipsia, no heat intolerance, no cold intolerance       Past Medical History:  Problem List:  2022-12: Familial hypercholesterolemia  2022-12: Vitamin D deficiency  2022-12: Primary hypothyroidism  2022-11: PONV (postoperative nausea and vomiting)  2022-10: Traumatic tear of right rotator cuff, initial encounter  2022-10: Biceps tendonitis on right  2022-10: Glenoid labral tear, right, initial encounter  2022-10: Subacromial impingement of right shoulder      Past Surgical History:  Past Surgical History:   Procedure Laterality Date    PB SHLDR ARTHROSCOP,SURG,W/ROTAT CUFF REPB Right 11/14/2022    Procedure: Right Shoulder arthroscopy with rotator cuff repair including subscapularis, biceps tenodesis, subacromial decrompression, labral debridment, repairs as indicated;  Surgeon: Oxana Medel M.D.;  Location: [facility];  Service: Orthopedics    PB ARTHROSCOPY SHOULDER SURGICAL BICEPS TENODES* Right 11/14/2022    Procedure: ARTHROSCOPY, SHOULDER, WITH BICEPS TENODESIS;  Surgeon: Oxana Medel M.D.;  Location: [facility];  Service: Orthopedics    [facility]    Procedure: DECOMPRESSION, SHOULDER, ARTHROSCOPIC;  Surgeon: Oxana Medel M.D.;  Location: [facility];  Service: Orthopedics    [facility] Right 11/14/2022    Procedure: ARTHROSCOPY, SHOULDER, WITH EXTENSIVE DEBRIDEMENT;  Surgeon: Oxana Medel M.D.;  Location: [facility];  Service: Orthopedics    OTHER ORTHOPEDIC SURGERY      back pain x 6 months    HI BREAST AUGMENTATION WITH IMPLANT          Allergies:  Codeine and Pcn [penicillins]  "    Social History:  Social History     Tobacco Use    Smoking status: Former     Current packs/day: 0.00     Average packs/day: 0.8 packs/day for 15.0 years (11.3 ttl pk-yrs)     Types: Cigarettes     Start date:      Quit date:      Years since quittin.6    Smokeless tobacco: Never    Tobacco comments:     QUIT 20+ YEARS AGO - PER PT, 2022   Vaping Use    Vaping Use: Never used   Substance Use Topics    Alcohol use: Yes     Comment: 3-4X WEEKLY        Family History:   family history includes Arthritis in her father; Diabetes in her mother; Heart Attack in her paternal grandfather; Heart Disease in her mother; Hyperlipidemia in her father, mother, and sister; Hypertension in her mother and sister; Other in her father.      PHYSICAL EXAM:   Vital signs: /86 (BP Location: Right arm, Patient Position: Sitting, BP Cuff Size: Large adult)   Pulse 87   Ht 1.702 m (5' 7\")   Wt 79.2 kg (174 lb 9.6 oz)   SpO2 100%   BMI 27.35 kg/m²   GENERAL: Well-developed, well-nourished in no apparent distress.   EYE:  No ocular asymmetry, PERRLA  HENT: Pink, moist mucous membranes.    NECK: No thyromegaly.   CARDIOVASCULAR:  No murmurs  LUNGS: Clear breath sounds  ABDOMEN: Soft, nontender   EXTREMITIES: No clubbing, cyanosis, or edema.   NEUROLOGICAL: No gross focal motor abnormalities   LYMPH: No cervical adenopathy palpated.   SKIN: No rashes, lesions.       Labs:  Lab Results   Component Value Date/Time    SODIUM 141 2023 06:49 AM    POTASSIUM 4.3 2023 06:49 AM    CHLORIDE 104 2023 06:49 AM    CO2 26 2023 06:49 AM    ANION 11.0 2023 06:49 AM    GLUCOSE 108 (H) 2023 06:49 AM    BUN 17 2023 06:49 AM    CREATININE 0.66 2023 06:49 AM    CALCIUM 9.1 2023 06:49 AM    ASTSGOT 23 2023 06:49 AM    ALTSGPT 17 2023 06:49 AM    TBILIRUBIN 0.2 2023 06:49 AM    ALBUMIN 4.5 2023 06:49 AM    TOTPROTEIN 7.1 2023 06:49 AM    GLOBULIN 2.6 " 08/24/2023 06:49 AM    AGRATIO 1.7 08/24/2023 06:49 AM       Lab Results   Component Value Date/Time    SODIUM 144 05/17/2023 0653    POTASSIUM 4.4 05/17/2023 0653    CHLORIDE 107 05/17/2023 0653    CO2 26 05/17/2023 0653    GLUCOSE 93 05/17/2023 0653    BUN 19 05/17/2023 0653    CREATININE 0.80 05/17/2023 0653    CALCIUM 9.2 05/17/2023 0653    ANION 11.0 05/17/2023 0653       Lab Results   Component Value Date/Time    CHOLSTRLTOT 294 (H) 05/17/2023 0653    TRIGLYCERIDE 220 (H) 05/17/2023 0653    HDL 59 05/17/2023 0653     (H) 05/17/2023 0653       Lab Results   Component Value Date/Time    TSHULTRASEN 0.500 05/17/2023 0653     Lab Results   Component Value Date/Time    FREET4 1.13 05/17/2023 0653     Lab Results   Component Value Date/Time    FREET3 2.83 05/17/2023 0653     No results found for: THYSTIMIG    No results found for: MICROSOMALA      Imaging:      ASSESSMENT/PLAN:   1. Primary hypothyroidism  Unstable  TSH suppressed at 0.19  Medication:  Levothyroxine 112 mcg - change to levothyroxine 112 mcg 6 days and 1/2 tablet on day 7  Cytomel 5cmg BID - continue  Patient understands Take your thyroid medication daily as discussed at visit.  Thyroid hormone should be taken first in the morning 30 minutes before eating.   Wait and take any iron supplements or multivitamins containing iron at least 6 hours after levothyroxine dose.  Patient understands to stop taking multivitamin 5 days prior to blood work   - Comp Metabolic Panel; Future  - TSH; Future  - FREE THYROXINE; Future  - T3 FREE; Future    2. Familial hypercholesterolemia  Unstable - improving  Patient is currently not on statin therapy due to side effects.   Recommend diet low in fats, and carbs  Recommend exercising 30 mins daily    3. Vitamin D deficiency  Stable  Continue current regimen - see HPI  - VITAMIN D,25 HYDROXY (DEFICIENCY); Future    4. Prediabetes  Stable  Recommend diet low in fats, and carbs  Recommend exercising 30 mins  daily    5. Fatigue, unspecified type  Unstable  Discussed causes of fatigue including anemia and vitamin B 12 deficiency. I will get additional blood work for further evaluation.   - FERRITIN; Future  - IRON/TOTAL IRON BIND; Future  - CBC WITH DIFFERENTIAL; Future  - VITAMIN B12; Future     Return in about 3 months (around 11/30/2023). Patient will have blood work done 2 weeks prior to next apt in 3 months.     Thank you kindly for allowing me to participate in the thyroid care plan for this patient.    Duke Espinosa, APRN   8/30/23    CC:   JOJO Schuster

## 2023-10-13 DIAGNOSIS — E03.9 PRIMARY HYPOTHYROIDISM: ICD-10-CM

## 2023-10-16 RX ORDER — LIOTHYRONINE SODIUM 5 UG/1
TABLET ORAL
Qty: 135 TABLET | Refills: 1 | Status: SHIPPED | OUTPATIENT
Start: 2023-10-16 | End: 2023-11-16

## 2023-11-16 DIAGNOSIS — E03.9 PRIMARY HYPOTHYROIDISM: ICD-10-CM

## 2023-11-16 RX ORDER — LIOTHYRONINE SODIUM 5 UG/1
TABLET ORAL
Qty: 270 TABLET | Refills: 1 | Status: SHIPPED | OUTPATIENT
Start: 2023-11-16 | End: 2024-03-18

## 2023-11-20 ENCOUNTER — HOSPITAL ENCOUNTER (OUTPATIENT)
Dept: LAB | Facility: MEDICAL CENTER | Age: 63
End: 2023-11-20
Payer: COMMERCIAL

## 2023-11-20 DIAGNOSIS — E55.9 VITAMIN D DEFICIENCY: ICD-10-CM

## 2023-11-20 DIAGNOSIS — E78.01 FAMILIAL HYPERCHOLESTEROLEMIA: ICD-10-CM

## 2023-11-20 DIAGNOSIS — R53.83 FATIGUE, UNSPECIFIED TYPE: ICD-10-CM

## 2023-11-20 DIAGNOSIS — E03.9 PRIMARY HYPOTHYROIDISM: ICD-10-CM

## 2023-11-20 LAB
25(OH)D3 SERPL-MCNC: 61 NG/ML (ref 30–100)
ALBUMIN SERPL BCP-MCNC: 4.2 G/DL (ref 3.2–4.9)
ALBUMIN/GLOB SERPL: 1.6 G/DL
ALP SERPL-CCNC: 104 U/L (ref 30–99)
ALT SERPL-CCNC: 13 U/L (ref 2–50)
ANION GAP SERPL CALC-SCNC: 11 MMOL/L (ref 7–16)
AST SERPL-CCNC: 20 U/L (ref 12–45)
BASOPHILS # BLD AUTO: 0.9 % (ref 0–1.8)
BASOPHILS # BLD: 0.06 K/UL (ref 0–0.12)
BILIRUB SERPL-MCNC: 0.2 MG/DL (ref 0.1–1.5)
BUN SERPL-MCNC: 13 MG/DL (ref 8–22)
CALCIUM ALBUM COR SERPL-MCNC: 9 MG/DL (ref 8.5–10.5)
CALCIUM SERPL-MCNC: 9.2 MG/DL (ref 8.5–10.5)
CHLORIDE SERPL-SCNC: 105 MMOL/L (ref 96–112)
CO2 SERPL-SCNC: 26 MMOL/L (ref 20–33)
CREAT SERPL-MCNC: 0.56 MG/DL (ref 0.5–1.4)
EOSINOPHIL # BLD AUTO: 0.12 K/UL (ref 0–0.51)
EOSINOPHIL NFR BLD: 1.8 % (ref 0–6.9)
ERYTHROCYTE [DISTWIDTH] IN BLOOD BY AUTOMATED COUNT: 62.9 FL (ref 35.9–50)
FERRITIN SERPL-MCNC: 16.7 NG/ML (ref 10–291)
GFR SERPLBLD CREATININE-BSD FMLA CKD-EPI: 104 ML/MIN/1.73 M 2
GLOBULIN SER CALC-MCNC: 2.7 G/DL (ref 1.9–3.5)
GLUCOSE SERPL-MCNC: 99 MG/DL (ref 65–99)
HCT VFR BLD AUTO: 41.2 % (ref 37–47)
HGB BLD-MCNC: 13.1 G/DL (ref 12–16)
IMM GRANULOCYTES # BLD AUTO: 0.01 K/UL (ref 0–0.11)
IMM GRANULOCYTES NFR BLD AUTO: 0.2 % (ref 0–0.9)
IRON SATN MFR SERPL: 8 % (ref 15–55)
IRON SERPL-MCNC: 28 UG/DL (ref 40–170)
LYMPHOCYTES # BLD AUTO: 2.58 K/UL (ref 1–4.8)
LYMPHOCYTES NFR BLD: 39.4 % (ref 22–41)
MCH RBC QN AUTO: 28.1 PG (ref 27–33)
MCHC RBC AUTO-ENTMCNC: 31.8 G/DL (ref 32.2–35.5)
MCV RBC AUTO: 88.2 FL (ref 81.4–97.8)
MONOCYTES # BLD AUTO: 0.54 K/UL (ref 0–0.85)
MONOCYTES NFR BLD AUTO: 8.3 % (ref 0–13.4)
NEUTROPHILS # BLD AUTO: 3.23 K/UL (ref 1.82–7.42)
NEUTROPHILS NFR BLD: 49.4 % (ref 44–72)
NRBC # BLD AUTO: 0 K/UL
NRBC BLD-RTO: 0 /100 WBC (ref 0–0.2)
PLATELET # BLD AUTO: 410 K/UL (ref 164–446)
PMV BLD AUTO: 11.3 FL (ref 9–12.9)
POTASSIUM SERPL-SCNC: 3.9 MMOL/L (ref 3.6–5.5)
PROT SERPL-MCNC: 6.9 G/DL (ref 6–8.2)
RBC # BLD AUTO: 4.67 M/UL (ref 4.2–5.4)
SODIUM SERPL-SCNC: 142 MMOL/L (ref 135–145)
T3FREE SERPL-MCNC: 2.97 PG/ML (ref 2–4.4)
T4 FREE SERPL-MCNC: 1.09 NG/DL (ref 0.93–1.7)
TIBC SERPL-MCNC: 342 UG/DL (ref 250–450)
TSH SERPL DL<=0.005 MIU/L-ACNC: 0.37 UIU/ML (ref 0.38–5.33)
UIBC SERPL-MCNC: 314 UG/DL (ref 110–370)
VIT B12 SERPL-MCNC: 798 PG/ML (ref 211–911)
WBC # BLD AUTO: 6.5 K/UL (ref 4.8–10.8)

## 2023-11-20 PROCEDURE — 82607 VITAMIN B-12: CPT

## 2023-11-20 PROCEDURE — 83540 ASSAY OF IRON: CPT

## 2023-11-20 PROCEDURE — 82306 VITAMIN D 25 HYDROXY: CPT

## 2023-11-20 PROCEDURE — 80053 COMPREHEN METABOLIC PANEL: CPT

## 2023-11-20 PROCEDURE — 84481 FREE ASSAY (FT-3): CPT

## 2023-11-20 PROCEDURE — 80061 LIPID PANEL: CPT

## 2023-11-20 PROCEDURE — 84443 ASSAY THYROID STIM HORMONE: CPT

## 2023-11-20 PROCEDURE — 84439 ASSAY OF FREE THYROXINE: CPT

## 2023-11-20 PROCEDURE — 83550 IRON BINDING TEST: CPT

## 2023-11-20 PROCEDURE — 85025 COMPLETE CBC W/AUTO DIFF WBC: CPT

## 2023-11-20 PROCEDURE — 82728 ASSAY OF FERRITIN: CPT

## 2023-11-20 PROCEDURE — 36415 COLL VENOUS BLD VENIPUNCTURE: CPT

## 2023-11-21 DIAGNOSIS — E78.01 FAMILIAL HYPERCHOLESTEROLEMIA: ICD-10-CM

## 2023-11-21 LAB
CHOLEST SERPL-MCNC: 227 MG/DL (ref 100–199)
HDLC SERPL-MCNC: 44 MG/DL
LDLC SERPL CALC-MCNC: 153 MG/DL
TRIGL SERPL-MCNC: 152 MG/DL (ref 0–149)

## 2023-11-29 ENCOUNTER — OFFICE VISIT (OUTPATIENT)
Dept: ENDOCRINOLOGY | Facility: MEDICAL CENTER | Age: 63
End: 2023-11-29
Payer: COMMERCIAL

## 2023-11-29 VITALS
SYSTOLIC BLOOD PRESSURE: 128 MMHG | HEART RATE: 106 BPM | BODY MASS INDEX: 25.43 KG/M2 | HEIGHT: 67 IN | OXYGEN SATURATION: 99 % | WEIGHT: 162 LBS | DIASTOLIC BLOOD PRESSURE: 82 MMHG

## 2023-11-29 DIAGNOSIS — E61.1 IRON DEFICIENCY: ICD-10-CM

## 2023-11-29 DIAGNOSIS — E78.01 FAMILIAL HYPERCHOLESTEROLEMIA: ICD-10-CM

## 2023-11-29 DIAGNOSIS — E03.9 PRIMARY HYPOTHYROIDISM: ICD-10-CM

## 2023-11-29 DIAGNOSIS — E55.9 VITAMIN D DEFICIENCY: ICD-10-CM

## 2023-11-29 DIAGNOSIS — R73.03 PREDIABETES: ICD-10-CM

## 2023-11-29 PROCEDURE — 3074F SYST BP LT 130 MM HG: CPT

## 2023-11-29 PROCEDURE — 99211 OFF/OP EST MAY X REQ PHY/QHP: CPT

## 2023-11-29 PROCEDURE — 3079F DIAST BP 80-89 MM HG: CPT

## 2023-11-29 PROCEDURE — 99214 OFFICE O/P EST MOD 30 MIN: CPT

## 2023-11-29 ASSESSMENT — FIBROSIS 4 INDEX: FIB4 SCORE: 0.83

## 2023-11-29 NOTE — PROGRESS NOTES
Chief Complaint: Follow up for Primary Hypothyroidism    HPI:     Mari Sahni is a 62 y.o. female here for follow up of Primary Hypothyroidism.  Since last visit patient reports feeling better.      Hypothyroidism  She remains on Levothyroxine 112 mcg 6 days and 1/2 tab day 7 and cytomel 5 mcg in the morning and 5 mcg in the afternoon which has been her dose for the past 3 month.       She reports good compliance and denies missing any daily doses.      She takes thyroid hormone prior to breakfast.       She denies taking any iron, calcium supplements or antacids.      She reports taking multivitamin    Weight has decreased 8 lbs since last visit    She currently reports fatigue- better, weight gain-better, anxiety-resolved, cold intolerance- resolved     She currently denies feeling cold and cold intolerance, constipation, swelling, anxiousness, feeling excessive energy, tremulousness, palpitations, sweating, weight loss, diarrhea, change in skin,  nails, or hair, heat intolerance, depression, goiter, ocular symptoms, amenorrhea.        Latest Reference Range & Units 11/20/23 08:41   TSH 0.380 - 5.330 uIU/mL 0.370 (L)   Free T-4 0.93 - 1.70 ng/dL 1.09   T3,Free 2.00 - 4.40 pg/mL 2.97       2. Hyperlipidemia  Zetia 10mg daily - stopped   Currently taking red rice yeast and fish oil  Unable to tolerate due to muscle aches   Latest Reference Range & Units 11/21/23 08:41   Cholesterol,Tot 100 - 199 mg/dL 227 (H)   Triglycerides 0 - 149 mg/dL 152 (H)   HDL >=40 mg/dL 44   LDL <100 mg/dL 153 (H)       3. Vitamin D deficiency  Currently taking 4000IU daily   Latest Reference Range & Units 11/20/23 08:41   25-Hydroxy   Vitamin D 25 30 - 100 ng/mL 61     4. Prediabetes  POC A1c in clinic today is 5.2  POC A1C on  is 5.5   Latest Reference Range & Units 03/06/19 07:40   Glycohemoglobin 0.0 - 5.6 % 5.8 (H)   Diet: generally healthy  Activity: working out at home   BMI: 27.35    5. Iron deficiency with mild anemia   Latest  Reference Range & Units 11/20/23 08:41   Iron 40 - 170 ug/dL 28 (L)   Total Iron Binding 250 - 450 ug/dL 342   % Saturation 15 - 55 % 8 (L)   Unsat Iron Binding 110 - 370 ug/dL 314      Latest Reference Range & Units 11/20/23 08:41   RBC 4.20 - 5.40 M/uL 4.67   Hemoglobin 12.0 - 16.0 g/dL 13.1      Latest Reference Range & Units 11/20/23 08:41   Ferritin 10.0 - 291.0 ng/mL 16.7       Patient's medications, allergies, and social histories were reviewed and updated as appropriate.      ROS:     CONS:     No fever, no chills   EYES:     No diplopia, no blurry vision   CV:           No chest pain, no palpitations   PULM:     No SOB, no cough, no hemoptysis.   GI:            No nausea, no vomiting, no diarrhea, no constipation   ENDO:     No polyuria, no polydipsia, no heat intolerance, no cold intolerance       Past Medical History:  Problem List:  2022-12: Familial hypercholesterolemia  2022-12: Vitamin D deficiency  2022-12: Primary hypothyroidism  2022-11: PONV (postoperative nausea and vomiting)  2022-10: Traumatic tear of right rotator cuff, initial encounter  2022-10: Biceps tendonitis on right  2022-10: Glenoid labral tear, right, initial encounter  2022-10: Subacromial impingement of right shoulder      Past Surgical History:  Past Surgical History:   Procedure Laterality Date    PB SHLDR ARTHROSCOP,SURG,W/ROTAT CUFF REPB Right 11/14/2022    Procedure: Right Shoulder arthroscopy with rotator cuff repair including subscapularis, biceps tenodesis, subacromial decrompression, labral debridment, repairs as indicated;  Surgeon: Oxana Medel M.D.;  Location: SURGERY [facility];  Service: Orthopedics    PB ARTHROSCOPY SHOULDER SURGICAL BICEPS TENODES* Right 11/14/2022    Procedure: ARTHROSCOPY, SHOULDER, WITH BICEPS TENODESIS;  Surgeon: Oxana Medel M.D.;  Location: SURGERY [facility];  Service: Orthopedics    CT SHLDR ARTHROSCOP,PART ACROMIOPLAS Right 11/14/2022    Procedure: DECOMPRESSION,  "SHOULDER, ARTHROSCOPIC;  Surgeon: Oxana Medel M.D.;  Location: SURGERY [facility];  Service: Orthopedics    KY SHLDR ARTHROSCOP EXTEN DEBRIDE 3+ Right 2022    Procedure: ARTHROSCOPY, SHOULDER, WITH EXTENSIVE DEBRIDEMENT;  Surgeon: Oxana Medel M.D.;  Location: SURGERY [facility];  Service: Orthopedics    OTHER ORTHOPEDIC SURGERY      back pain x 6 months    KY BREAST AUGMENTATION WITH IMPLANT          Allergies:  Codeine and Pcn [penicillins]     Social History:  Social History     Tobacco Use    Smoking status: Former     Current packs/day: 0.00     Average packs/day: 0.8 packs/day for 15.0 years (11.3 ttl pk-yrs)     Types: Cigarettes     Start date:      Quit date:      Years since quittin.9    Smokeless tobacco: Never    Tobacco comments:     QUIT 20+ YEARS AGO - PER PT, 2022   Vaping Use    Vaping Use: Never used   Substance Use Topics    Alcohol use: Yes     Comment: 3-4X WEEKLY        Family History:   family history includes Arthritis in her father; Diabetes in her mother; Heart Attack in her paternal grandfather; Heart Disease in her mother; Hyperlipidemia in her father, mother, and sister; Hypertension in her mother and sister; Other in her father.      PHYSICAL EXAM:   Vital signs: /82 (BP Location: Right arm, Patient Position: Sitting, BP Cuff Size: Adult)   Pulse (!) 106   Ht 1.702 m (5' 7\")   Wt 73.5 kg (162 lb)   SpO2 99%   BMI 25.37 kg/m²   GENERAL: Well-developed, well-nourished in no apparent distress.   EYE:  No ocular asymmetry, PERRLA  HENT: Pink, moist mucous membranes.    NECK: No thyromegaly.   CARDIOVASCULAR:  No murmurs  LUNGS: Clear breath sounds  ABDOMEN: Soft, nontender   EXTREMITIES: No clubbing, cyanosis, or edema.   NEUROLOGICAL: No gross focal motor abnormalities   LYMPH: No cervical adenopathy palpated.   SKIN: No rashes, lesions.       Labs:  Lab Results   Component Value Date/Time    SODIUM 142 2023 08:41 AM    " POTASSIUM 3.9 11/20/2023 08:41 AM    CHLORIDE 105 11/20/2023 08:41 AM    CO2 26 11/20/2023 08:41 AM    ANION 11.0 11/20/2023 08:41 AM    GLUCOSE 99 11/20/2023 08:41 AM    BUN 13 11/20/2023 08:41 AM    CREATININE 0.56 11/20/2023 08:41 AM    CALCIUM 9.2 11/20/2023 08:41 AM    ASTSGOT 20 11/20/2023 08:41 AM    ALTSGPT 13 11/20/2023 08:41 AM    TBILIRUBIN 0.2 11/20/2023 08:41 AM    ALBUMIN 4.2 11/20/2023 08:41 AM    TOTPROTEIN 6.9 11/20/2023 08:41 AM    GLOBULIN 2.7 11/20/2023 08:41 AM    AGRATIO 1.6 11/20/2023 08:41 AM       Lab Results   Component Value Date/Time    SODIUM 144 05/17/2023 0653    POTASSIUM 4.4 05/17/2023 0653    CHLORIDE 107 05/17/2023 0653    CO2 26 05/17/2023 0653    GLUCOSE 93 05/17/2023 0653    BUN 19 05/17/2023 0653    CREATININE 0.80 05/17/2023 0653    CALCIUM 9.2 05/17/2023 0653    ANION 11.0 05/17/2023 0653       Lab Results   Component Value Date/Time    CHOLSTRLTOT 294 (H) 05/17/2023 0653    TRIGLYCERIDE 220 (H) 05/17/2023 0653    HDL 59 05/17/2023 0653     (H) 05/17/2023 0653       Lab Results   Component Value Date/Time    TSHULTRASEN 0.500 05/17/2023 0653     Lab Results   Component Value Date/Time    FREET4 1.13 05/17/2023 0653     Lab Results   Component Value Date/Time    FREET3 2.83 05/17/2023 0653     No results found for: THYSTIMIG    No results found for: MICROSOMALA      Imaging:      ASSESSMENT/PLAN:   1. Primary hypothyroidism  Unstable  TSH suppressed at 0.37  Medication:  levothyroxine 112 mcg 6 days and 1/2 tablet on day 7- change to levothyroxine 112 mcg 6 days  Cytomel 5cmg BID - continue  Patient understands Take your thyroid medication daily as discussed at visit.  Thyroid hormone should be taken first in the morning 30 minutes before eating.   Wait and take any iron supplements or multivitamins containing iron at least 6 hours after levothyroxine dose.  Patient understands to stop taking multivitamin 5 days prior to blood work   - TSH; Future  - T3 FREE; Future  -  FREE THYROXINE; Future    2. Vitamin D deficiency  Stable  Continue current regimen - see HPI  - VITAMIN D,25 HYDROXY (DEFICIENCY); Future    3. Prediabetes  Stable  Recommend diet low in fats, and carbs  Recommend exercising 30 mins daily    4. Familial hypercholesterolemia  Unstable - improving  Patient is currently not on statin therapy due to side effects.   Recommend diet low in fats, and carbs  Recommend exercising 30 mins daily    5. Iron deficiency  Unstable  Recommend otc iron patch daily  We will repeat levels in 3 months.   - IRON/TOTAL IRON BIND; Future  - CBC WITH DIFFERENTIAL; Future  - FERRITIN; Future     Return in about 3 months (around 2/29/2024). Patient will have blood work done 2 weeks prior to next apt in 3 months.     Thank you kindly for allowing me to participate in the thyroid care plan for this patient.    [facility] Espinosa, APRN   11/29/23    CC:   JOJO Schuster

## 2024-02-06 DIAGNOSIS — E03.9 PRIMARY HYPOTHYROIDISM: ICD-10-CM

## 2024-02-06 RX ORDER — LEVOTHYROXINE SODIUM 112 UG/1
112 TABLET ORAL
Qty: 90 TABLET | Refills: 3 | Status: SHIPPED | OUTPATIENT
Start: 2024-02-06

## 2024-03-18 ENCOUNTER — TELEPHONE (OUTPATIENT)
Dept: ENDOCRINOLOGY | Facility: MEDICAL CENTER | Age: 64
End: 2024-03-18
Payer: COMMERCIAL

## 2024-03-18 DIAGNOSIS — E03.9 PRIMARY HYPOTHYROIDISM: ICD-10-CM

## 2024-03-18 RX ORDER — LIOTHYRONINE SODIUM 5 UG/1
5 TABLET ORAL 2 TIMES DAILY
Qty: 180 TABLET | Refills: 3 | Status: SHIPPED | OUTPATIENT
Start: 2024-03-18

## 2024-03-18 NOTE — TELEPHONE ENCOUNTER
VOICEMAIL  1. Caller Name: Mari Sahni                      Call Back Number: There are no phone numbers on file.      2. Message: [facility] had increased thyroid medicaiton, patient can't get Rx due to pharmacy not aware of increase

## 2024-04-08 ENCOUNTER — HOSPITAL ENCOUNTER (OUTPATIENT)
Dept: LAB | Facility: MEDICAL CENTER | Age: 64
End: 2024-04-08
Payer: COMMERCIAL

## 2024-04-08 DIAGNOSIS — E03.9 PRIMARY HYPOTHYROIDISM: ICD-10-CM

## 2024-04-08 DIAGNOSIS — E61.1 IRON DEFICIENCY: ICD-10-CM

## 2024-04-08 DIAGNOSIS — E55.9 VITAMIN D DEFICIENCY: ICD-10-CM

## 2024-04-08 LAB
25(OH)D3 SERPL-MCNC: 56 NG/ML (ref 30–100)
BASOPHILS # BLD AUTO: 0.7 % (ref 0–1.8)
BASOPHILS # BLD: 0.05 K/UL (ref 0–0.12)
EOSINOPHIL # BLD AUTO: 0.2 K/UL (ref 0–0.51)
EOSINOPHIL NFR BLD: 2.9 % (ref 0–6.9)
ERYTHROCYTE [DISTWIDTH] IN BLOOD BY AUTOMATED COUNT: 47.2 FL (ref 35.9–50)
FERRITIN SERPL-MCNC: 21 NG/ML (ref 10–291)
HCT VFR BLD AUTO: 40.9 % (ref 37–47)
HGB BLD-MCNC: 13.5 G/DL (ref 12–16)
IMM GRANULOCYTES # BLD AUTO: 0.02 K/UL (ref 0–0.11)
IMM GRANULOCYTES NFR BLD AUTO: 0.3 % (ref 0–0.9)
IRON SATN MFR SERPL: 11 % (ref 15–55)
IRON SERPL-MCNC: 37 UG/DL (ref 40–170)
LYMPHOCYTES # BLD AUTO: 2.77 K/UL (ref 1–4.8)
LYMPHOCYTES NFR BLD: 39.8 % (ref 22–41)
MCH RBC QN AUTO: 32.1 PG (ref 27–33)
MCHC RBC AUTO-ENTMCNC: 33 G/DL (ref 32.2–35.5)
MCV RBC AUTO: 97.4 FL (ref 81.4–97.8)
MONOCYTES # BLD AUTO: 0.56 K/UL (ref 0–0.85)
MONOCYTES NFR BLD AUTO: 8 % (ref 0–13.4)
NEUTROPHILS # BLD AUTO: 3.36 K/UL (ref 1.82–7.42)
NEUTROPHILS NFR BLD: 48.3 % (ref 44–72)
NRBC # BLD AUTO: 0 K/UL
NRBC BLD-RTO: 0 /100 WBC (ref 0–0.2)
PLATELET # BLD AUTO: 379 K/UL (ref 164–446)
PMV BLD AUTO: 10.6 FL (ref 9–12.9)
RBC # BLD AUTO: 4.2 M/UL (ref 4.2–5.4)
T3FREE SERPL-MCNC: 3.16 PG/ML (ref 2–4.4)
T4 FREE SERPL-MCNC: 0.95 NG/DL (ref 0.93–1.7)
TIBC SERPL-MCNC: 349 UG/DL (ref 250–450)
TSH SERPL DL<=0.005 MIU/L-ACNC: 0.8 UIU/ML (ref 0.38–5.33)
UIBC SERPL-MCNC: 312 UG/DL (ref 110–370)
WBC # BLD AUTO: 7 K/UL (ref 4.8–10.8)

## 2024-04-08 PROCEDURE — 36415 COLL VENOUS BLD VENIPUNCTURE: CPT

## 2024-04-08 PROCEDURE — 83550 IRON BINDING TEST: CPT

## 2024-04-08 PROCEDURE — 82728 ASSAY OF FERRITIN: CPT

## 2024-04-08 PROCEDURE — 85025 COMPLETE CBC W/AUTO DIFF WBC: CPT

## 2024-04-08 PROCEDURE — 84443 ASSAY THYROID STIM HORMONE: CPT

## 2024-04-08 PROCEDURE — 82306 VITAMIN D 25 HYDROXY: CPT

## 2024-04-08 PROCEDURE — 83540 ASSAY OF IRON: CPT

## 2024-04-08 PROCEDURE — 84439 ASSAY OF FREE THYROXINE: CPT

## 2024-04-08 PROCEDURE — 84481 FREE ASSAY (FT-3): CPT

## 2024-04-09 DIAGNOSIS — E61.1 IRON DEFICIENCY: ICD-10-CM

## 2024-04-09 DIAGNOSIS — E03.9 PRIMARY HYPOTHYROIDISM: ICD-10-CM

## 2024-04-09 RX ORDER — FERROUS SULFATE 325(65) MG
325 TABLET ORAL DAILY
Qty: 90 TABLET | Refills: 3 | Status: SHIPPED | OUTPATIENT
Start: 2024-04-09

## 2024-09-11 ENCOUNTER — HOSPITAL ENCOUNTER (OUTPATIENT)
Dept: LAB | Facility: MEDICAL CENTER | Age: 64
End: 2024-09-11
Attending: NURSE PRACTITIONER
Payer: COMMERCIAL

## 2024-09-11 LAB
25(OH)D3 SERPL-MCNC: 59 NG/ML (ref 30–100)
ALBUMIN SERPL BCP-MCNC: 4.3 G/DL (ref 3.2–4.9)
ALBUMIN/GLOB SERPL: 1.7 G/DL
ALP SERPL-CCNC: 87 U/L (ref 30–99)
ALT SERPL-CCNC: 15 U/L (ref 2–50)
ANION GAP SERPL CALC-SCNC: 15 MMOL/L (ref 7–16)
AST SERPL-CCNC: 20 U/L (ref 12–45)
BASOPHILS # BLD AUTO: 1.1 % (ref 0–1.8)
BASOPHILS # BLD: 0.07 K/UL (ref 0–0.12)
BILIRUB SERPL-MCNC: 0.3 MG/DL (ref 0.1–1.5)
BUN SERPL-MCNC: 21 MG/DL (ref 8–22)
CALCIUM ALBUM COR SERPL-MCNC: 8.7 MG/DL (ref 8.5–10.5)
CALCIUM SERPL-MCNC: 8.9 MG/DL (ref 8.5–10.5)
CHLORIDE SERPL-SCNC: 103 MMOL/L (ref 96–112)
CHOLEST SERPL-MCNC: 263 MG/DL (ref 100–199)
CO2 SERPL-SCNC: 24 MMOL/L (ref 20–33)
CREAT SERPL-MCNC: 0.71 MG/DL (ref 0.5–1.4)
EOSINOPHIL # BLD AUTO: 0.1 K/UL (ref 0–0.51)
EOSINOPHIL NFR BLD: 1.6 % (ref 0–6.9)
ERYTHROCYTE [DISTWIDTH] IN BLOOD BY AUTOMATED COUNT: 50.4 FL (ref 35.9–50)
FERRITIN SERPL-MCNC: 36.7 NG/ML (ref 10–291)
GFR SERPLBLD CREATININE-BSD FMLA CKD-EPI: 96 ML/MIN/1.73 M 2
GLOBULIN SER CALC-MCNC: 2.5 G/DL (ref 1.9–3.5)
GLUCOSE SERPL-MCNC: 103 MG/DL (ref 65–99)
HCT VFR BLD AUTO: 43.1 % (ref 37–47)
HDLC SERPL-MCNC: 69 MG/DL
HGB BLD-MCNC: 13.8 G/DL (ref 12–16)
IMM GRANULOCYTES # BLD AUTO: 0.02 K/UL (ref 0–0.11)
IMM GRANULOCYTES NFR BLD AUTO: 0.3 % (ref 0–0.9)
IRON SATN MFR SERPL: 35 % (ref 15–55)
IRON SERPL-MCNC: 112 UG/DL (ref 40–170)
LDLC SERPL CALC-MCNC: 171 MG/DL
LYMPHOCYTES # BLD AUTO: 2.11 K/UL (ref 1–4.8)
LYMPHOCYTES NFR BLD: 34.5 % (ref 22–41)
MCH RBC QN AUTO: 32.6 PG (ref 27–33)
MCHC RBC AUTO-ENTMCNC: 32 G/DL (ref 32.2–35.5)
MCV RBC AUTO: 101.9 FL (ref 81.4–97.8)
MONOCYTES # BLD AUTO: 0.57 K/UL (ref 0–0.85)
MONOCYTES NFR BLD AUTO: 9.3 % (ref 0–13.4)
NEUTROPHILS # BLD AUTO: 3.25 K/UL (ref 1.82–7.42)
NEUTROPHILS NFR BLD: 53.2 % (ref 44–72)
NRBC # BLD AUTO: 0 K/UL
NRBC BLD-RTO: 0 /100 WBC (ref 0–0.2)
PLATELET # BLD AUTO: 336 K/UL (ref 164–446)
PMV BLD AUTO: 10.5 FL (ref 9–12.9)
POTASSIUM SERPL-SCNC: 4.6 MMOL/L (ref 3.6–5.5)
PROT SERPL-MCNC: 6.8 G/DL (ref 6–8.2)
RBC # BLD AUTO: 4.23 M/UL (ref 4.2–5.4)
SODIUM SERPL-SCNC: 142 MMOL/L (ref 135–145)
TIBC SERPL-MCNC: 318 UG/DL (ref 250–450)
TRIGL SERPL-MCNC: 113 MG/DL (ref 0–149)
UIBC SERPL-MCNC: 206 UG/DL (ref 110–370)
WBC # BLD AUTO: 6.1 K/UL (ref 4.8–10.8)

## 2024-09-11 PROCEDURE — 83540 ASSAY OF IRON: CPT

## 2024-09-11 PROCEDURE — 82306 VITAMIN D 25 HYDROXY: CPT

## 2024-09-11 PROCEDURE — 85025 COMPLETE CBC W/AUTO DIFF WBC: CPT

## 2024-09-11 PROCEDURE — 82728 ASSAY OF FERRITIN: CPT

## 2024-09-11 PROCEDURE — 80061 LIPID PANEL: CPT

## 2024-09-11 PROCEDURE — 83550 IRON BINDING TEST: CPT

## 2024-09-11 PROCEDURE — 36415 COLL VENOUS BLD VENIPUNCTURE: CPT

## 2024-09-11 PROCEDURE — 80053 COMPREHEN METABOLIC PANEL: CPT

## 2024-12-16 ENCOUNTER — HOSPITAL ENCOUNTER (OUTPATIENT)
Dept: LAB | Facility: MEDICAL CENTER | Age: 64
End: 2024-12-16
Attending: NURSE PRACTITIONER
Payer: COMMERCIAL

## 2024-12-16 LAB
25(OH)D3 SERPL-MCNC: 53 NG/ML (ref 30–100)
ALBUMIN SERPL BCP-MCNC: 4.6 G/DL (ref 3.2–4.9)
ALBUMIN/GLOB SERPL: 1.6 G/DL
ALP SERPL-CCNC: 106 U/L (ref 30–99)
ALT SERPL-CCNC: 11 U/L (ref 2–50)
ANION GAP SERPL CALC-SCNC: 8 MMOL/L (ref 7–16)
APPEARANCE UR: CLEAR
AST SERPL-CCNC: 18 U/L (ref 12–45)
BACTERIA #/AREA URNS HPF: ABNORMAL /HPF
BASOPHILS # BLD AUTO: 1.2 % (ref 0–1.8)
BASOPHILS # BLD: 0.08 K/UL (ref 0–0.12)
BILIRUB SERPL-MCNC: 0.2 MG/DL (ref 0.1–1.5)
BILIRUB UR QL STRIP.AUTO: NEGATIVE
BUN SERPL-MCNC: 19 MG/DL (ref 8–22)
CALCIUM ALBUM COR SERPL-MCNC: 9 MG/DL (ref 8.5–10.5)
CALCIUM SERPL-MCNC: 9.5 MG/DL (ref 8.5–10.5)
CASTS URNS QL MICRO: ABNORMAL /LPF (ref 0–2)
CHLORIDE SERPL-SCNC: 101 MMOL/L (ref 96–112)
CHOLEST SERPL-MCNC: 235 MG/DL (ref 100–199)
CO2 SERPL-SCNC: 29 MMOL/L (ref 20–33)
COLOR UR: YELLOW
CORTIS SERPL-MCNC: 15 UG/DL (ref 0–23)
CREAT SERPL-MCNC: 0.6 MG/DL (ref 0.5–1.4)
CRP SERPL HS-MCNC: 16.8 MG/L (ref 0–3)
DHEA-S SERPL-MCNC: 147 UG/DL (ref 18.9–205)
EOSINOPHIL # BLD AUTO: 0.32 K/UL (ref 0–0.51)
EOSINOPHIL NFR BLD: 5 % (ref 0–6.9)
EPITHELIAL CELLS 1715: ABNORMAL /HPF (ref 0–5)
ERYTHROCYTE [DISTWIDTH] IN BLOOD BY AUTOMATED COUNT: 45.1 FL (ref 35.9–50)
EST. AVERAGE GLUCOSE BLD GHB EST-MCNC: 97 MG/DL
FERRITIN SERPL-MCNC: 69.1 NG/ML (ref 10–291)
FOLATE SERPL-MCNC: 18.9 NG/ML
FSH SERPL-ACNC: 44.4 MIU/ML
GFR SERPLBLD CREATININE-BSD FMLA CKD-EPI: 101 ML/MIN/1.73 M 2
GLOBULIN SER CALC-MCNC: 2.8 G/DL (ref 1.9–3.5)
GLUCOSE SERPL-MCNC: 96 MG/DL (ref 65–99)
GLUCOSE UR STRIP.AUTO-MCNC: NEGATIVE MG/DL
HBA1C MFR BLD: 5 % (ref 4–5.6)
HCT VFR BLD AUTO: 44 % (ref 37–47)
HCYS SERPL-SCNC: 5.41 UMOL/L
HDLC SERPL-MCNC: 66 MG/DL
HGB BLD-MCNC: 14.4 G/DL (ref 12–16)
IMM GRANULOCYTES # BLD AUTO: 0.01 K/UL (ref 0–0.11)
IMM GRANULOCYTES NFR BLD AUTO: 0.2 % (ref 0–0.9)
IRON SATN MFR SERPL: 10 % (ref 15–55)
IRON SERPL-MCNC: 31 UG/DL (ref 40–170)
KETONES UR STRIP.AUTO-MCNC: NEGATIVE MG/DL
LDLC SERPL CALC-MCNC: 143 MG/DL
LEUKOCYTE ESTERASE UR QL STRIP.AUTO: ABNORMAL
LH SERPL-ACNC: 34 IU/L
LYMPHOCYTES # BLD AUTO: 1.84 K/UL (ref 1–4.8)
LYMPHOCYTES NFR BLD: 28.6 % (ref 22–41)
MAGNESIUM SERPL-MCNC: 2.3 MG/DL (ref 1.5–2.5)
MCH RBC QN AUTO: 31.7 PG (ref 27–33)
MCHC RBC AUTO-ENTMCNC: 32.7 G/DL (ref 32.2–35.5)
MCV RBC AUTO: 96.9 FL (ref 81.4–97.8)
MICRO URNS: ABNORMAL
MONOCYTES # BLD AUTO: 0.51 K/UL (ref 0–0.85)
MONOCYTES NFR BLD AUTO: 7.9 % (ref 0–13.4)
NEUTROPHILS # BLD AUTO: 3.67 K/UL (ref 1.82–7.42)
NEUTROPHILS NFR BLD: 57.1 % (ref 44–72)
NITRITE UR QL STRIP.AUTO: NEGATIVE
NRBC # BLD AUTO: 0 K/UL
NRBC BLD-RTO: 0 /100 WBC (ref 0–0.2)
PH UR STRIP.AUTO: 7.5 [PH] (ref 5–8)
PHOSPHATE SERPL-MCNC: 2.7 MG/DL (ref 2.5–4.5)
PLATELET # BLD AUTO: 359 K/UL (ref 164–446)
PMV BLD AUTO: 10.6 FL (ref 9–12.9)
POTASSIUM SERPL-SCNC: 4.3 MMOL/L (ref 3.6–5.5)
PROT SERPL-MCNC: 7.4 G/DL (ref 6–8.2)
PROT UR QL STRIP: NEGATIVE MG/DL
RBC # BLD AUTO: 4.54 M/UL (ref 4.2–5.4)
RBC # URNS HPF: ABNORMAL /HPF (ref 0–2)
RBC UR QL AUTO: NEGATIVE
SODIUM SERPL-SCNC: 138 MMOL/L (ref 135–145)
SP GR UR STRIP.AUTO: 1.02
T3FREE SERPL-MCNC: 2.56 PG/ML (ref 2–4.4)
T4 FREE SERPL-MCNC: 0.9 NG/DL (ref 0.93–1.7)
TIBC SERPL-MCNC: 304 UG/DL (ref 250–450)
TRIGL SERPL-MCNC: 128 MG/DL (ref 0–149)
TSH SERPL-ACNC: 0.57 UIU/ML (ref 0.35–5.5)
UIBC SERPL-MCNC: 273 UG/DL (ref 110–370)
UROBILINOGEN UR STRIP.AUTO-MCNC: 1 EU/DL
VIT B12 SERPL-MCNC: >4000 PG/ML (ref 211–911)
WBC # BLD AUTO: 6.4 K/UL (ref 4.8–10.8)
WBC #/AREA URNS HPF: ABNORMAL /HPF

## 2024-12-16 PROCEDURE — 83540 ASSAY OF IRON: CPT

## 2024-12-16 PROCEDURE — 86664 EPSTEIN-BARR NUCLEAR ANTIGEN: CPT

## 2024-12-16 PROCEDURE — 83695 ASSAY OF LIPOPROTEIN(A): CPT

## 2024-12-16 PROCEDURE — 86663 EPSTEIN-BARR ANTIBODY: CPT

## 2024-12-16 PROCEDURE — 84403 ASSAY OF TOTAL TESTOSTERONE: CPT

## 2024-12-16 PROCEDURE — 83735 ASSAY OF MAGNESIUM: CPT

## 2024-12-16 PROCEDURE — 81001 URINALYSIS AUTO W/SCOPE: CPT

## 2024-12-16 PROCEDURE — 84482 T3 REVERSE: CPT

## 2024-12-16 PROCEDURE — 36415 COLL VENOUS BLD VENIPUNCTURE: CPT

## 2024-12-16 PROCEDURE — 86665 EPSTEIN-BARR CAPSID VCA: CPT | Mod: 91

## 2024-12-16 PROCEDURE — 84481 FREE ASSAY (FT-3): CPT

## 2024-12-16 PROCEDURE — 83550 IRON BINDING TEST: CPT

## 2024-12-16 PROCEDURE — 85025 COMPLETE CBC W/AUTO DIFF WBC: CPT

## 2024-12-16 PROCEDURE — 82670 ASSAY OF TOTAL ESTRADIOL: CPT

## 2024-12-16 PROCEDURE — 83018 HEAVY METAL QUAN EACH NES: CPT

## 2024-12-16 PROCEDURE — 84439 ASSAY OF FREE THYROXINE: CPT

## 2024-12-16 PROCEDURE — 84305 ASSAY OF SOMATOMEDIN: CPT

## 2024-12-16 PROCEDURE — 83498 ASY HYDROXYPROGESTERONE 17-D: CPT

## 2024-12-16 PROCEDURE — 83525 ASSAY OF INSULIN: CPT

## 2024-12-16 PROCEDURE — 84255 ASSAY OF SELENIUM: CPT

## 2024-12-16 PROCEDURE — 84443 ASSAY THYROID STIM HORMONE: CPT

## 2024-12-16 PROCEDURE — 82728 ASSAY OF FERRITIN: CPT

## 2024-12-16 PROCEDURE — 83090 ASSAY OF HOMOCYSTEINE: CPT

## 2024-12-16 PROCEDURE — 86141 C-REACTIVE PROTEIN HS: CPT

## 2024-12-16 PROCEDURE — 82607 VITAMIN B-12: CPT

## 2024-12-16 PROCEDURE — 82306 VITAMIN D 25 HYDROXY: CPT

## 2024-12-16 PROCEDURE — 80061 LIPID PANEL: CPT

## 2024-12-16 PROCEDURE — 84100 ASSAY OF PHOSPHORUS: CPT

## 2024-12-16 PROCEDURE — 82627 DEHYDROEPIANDROSTERONE: CPT

## 2024-12-16 PROCEDURE — 86039 ANTINUCLEAR ANTIBODIES (ANA): CPT

## 2024-12-16 PROCEDURE — 83001 ASSAY OF GONADOTROPIN (FSH): CPT

## 2024-12-16 PROCEDURE — 86038 ANTINUCLEAR ANTIBODIES: CPT

## 2024-12-16 PROCEDURE — 83036 HEMOGLOBIN GLYCOSYLATED A1C: CPT

## 2024-12-16 PROCEDURE — 84630 ASSAY OF ZINC: CPT

## 2024-12-16 PROCEDURE — 82533 TOTAL CORTISOL: CPT

## 2024-12-16 PROCEDURE — 82746 ASSAY OF FOLIC ACID SERUM: CPT

## 2024-12-16 PROCEDURE — 83002 ASSAY OF GONADOTROPIN (LH): CPT

## 2024-12-16 PROCEDURE — 82139 AMINO ACIDS QUAN 6 OR MORE: CPT

## 2024-12-16 PROCEDURE — 80053 COMPREHEN METABOLIC PANEL: CPT

## 2024-12-16 PROCEDURE — 82525 ASSAY OF COPPER: CPT

## 2024-12-18 LAB
COPPER SERPL-MCNC: 149.1 UG/DL (ref 80–155)
EBV EA-D IGG SER-ACNC: 56.1 U/ML (ref 0–10.9)
EBV NA IGG SER IA-ACNC: 303 U/ML (ref 0–21.9)
EBV VCA IGG SER IA-ACNC: 170 U/ML (ref 0–21.9)
EBV VCA IGM SER IA-ACNC: 38.5 U/ML (ref 0–43.9)
ESTRADIOL SERPL HS-MCNC: 4.1 PG/ML
INSULIN P FAST SERPL-ACNC: 10 UIU/ML (ref 3–25)
NUCLEAR IGG SER QL IA: DETECTED
SELENIUM SERPL-MCNC: 129.6 UG/L (ref 23–190)
ZINC SERPL-MCNC: 87 UG/DL (ref 60–120)

## 2024-12-19 LAB
ANA PAT SER IF-IMP: NORMAL
IGF-I SERPL-MCNC: 172 NG/ML (ref 40–244)
IGF-I Z-SCORE SERPL: 0.9
LPA SERPL-MCNC: 133 MG/DL
NUCLEAR IGG SER QL IF: NORMAL
TESTOST SERPL-MCNC: 13 NG/DL (ref 5–32)

## 2024-12-20 LAB
(HCYS)2 SERPL-SCNC: <2 UMOL/L
A-AMINOBUTYR SERPL-SCNC: 16 UMOL/L
AAA SERPL-SCNC: <2 UMOL/L
ALANINE SERPL-SCNC: 261 UMOL/L (ref 160–530)
ALLOISOLEUCINE SERPL-SCNC: <2 UMOL/L
AMINO ACID PAT SERPL-IMP: NORMAL
ANSERINE SERPL-SCNC: <5 UMOL/L
ARGININE SERPL-SCNC: 74 UMOL/L (ref 35–125)
ARGININOSUCCINATE SERPL-SCNC: <2 UMOL/L
ASPARAGINE SERPL-SCNC: 34 UMOL/L (ref 20–80)
ASPARTATE SERPL-SCNC: <5 UMOL/L
B-AIB SERPL-SCNC: <5 UMOL/L
B-ALANINE SERPL-SCNC: <25 UMOL/L
CITRULLINE SERPL-SCNC: 25 UMOL/L (ref 10–45)
CYSTATHIONIN SERPL-SCNC: <5 UMOL/L
CYSTINE SERPL-SCNC: 41 UMOL/L (ref 10–65)
ETHANOLAMINE SERPL-SCNC: 5 UMOL/L
GABA SERPL-SCNC: <5 UMOL/L
GLUTAMATE SERPL-SCNC: 53 UMOL/L (ref 15–130)
GLUTAMINE SERPL-SCNC: 429 UMOL/L (ref 380–680)
GLYCINE SERPL-SCNC: 165 UMOL/L (ref 140–420)
HISTIDINE SERPL-SCNC: 65 UMOL/L (ref 50–130)
HOMOCITRULLINE SERPL-SCNC: <5 UMOL/L
ISOLEUCINE SERPL-SCNC: 51 UMOL/L (ref 30–120)
LEUCINE SERPL-SCNC: 106 UMOL/L (ref 60–180)
LYSINE SERPL-SCNC: 157 UMOL/L (ref 85–230)
METHIONINE SERPL-SCNC: 21 UMOL/L (ref 15–40)
OH-LYSINE SERPL-SCNC: <5 UMOL/L
OH-PROLINE SERPL-SCNC: 10 UMOL/L (ref 5–40)
ORNITHINE SERPL-SCNC: 51 UMOL/L (ref 25–110)
PHE SERPL-SCNC: 50 UMOL/L (ref 30–82)
PROLINE SERPL-SCNC: 133 UMOL/L (ref 90–350)
SARCOSINE SERPL-SCNC: <5 UMOL/L
SERINE SERPL-SCNC: 69 UMOL/L (ref 60–170)
T3REVERSE SERPL-MCNC: 10.8 NG/DL (ref 9–27)
TAURINE SERPL-SCNC: 71 UMOL/L (ref 30–130)
THREONINE SERPL-SCNC: 103 UMOL/L (ref 60–190)
TRYPTOPHAN SERPL-SCNC: 42 UMOL/L (ref 25–80)
TYROSINE SERPL-SCNC: 51 UMOL/L (ref 35–110)
VALINE SERPL-SCNC: 208 UMOL/L (ref 120–320)

## 2024-12-24 LAB — 17OHP SERPL-MCNC: 20.49 NG/DL

## 2024-12-26 LAB — TEST NAME 95000: NORMAL

## (undated) DEVICE — PENCIL ELECTSURG 10FT HLSTR - WITH BLADE (50EA/CA)

## (undated) DEVICE — SHAVER4.0 AGGRESSIVE + FORMLA (5EA/BX)

## (undated) DEVICE — CLOSURE SKIN STRIP 1/2 X 4 IN - (STERI STRIP) (50/BX 4BX/CA)

## (undated) DEVICE — NEEDLE DAVIS TONSIL 1/2 CIR - (2EA/PK20PK/BX)

## (undated) DEVICE — SUTURE GENERAL

## (undated) DEVICE — SPIDER SHOULDER HOLDER (12EA/BX)

## (undated) DEVICE — ABLATOR WAND SERFAS 90-S CRUISE

## (undated) DEVICE — LACTATED RINGERS INJ 1000 ML - (14EA/CA 60CA/PF)

## (undated) DEVICE — DRAPETIBURON SHOULDER W/POUCH - (5EA/CA)

## (undated) DEVICE — PACK SHOULDER ARTHROSCOPY SM - (2EA/CA)

## (undated) DEVICE — DRAPE IOBAN II INCISE 23X17 - (10EA/BX 4BX/CA)

## (undated) DEVICE — SODIUM CHL. IRRIGATION 0.9% 3000ML (4EA/CA 65CA/PF)

## (undated) DEVICE — SUTURE 2-0 MONOCRYL PLUS UNDYED CT-1 1 X 36 (36EA/BX)"

## (undated) DEVICE — GOWN SURGEONS X-LARGE - DISP. (30/CA)

## (undated) DEVICE — DRAPE U SPLIT IMP 54 X 76 - (24/CA)

## (undated) DEVICE — TUBING DAY USE W/CARTRIDGE (10EA/BX)

## (undated) DEVICE — GLOVE, LITE (PAIR)

## (undated) DEVICE — SHAVER 5.5 RESECTOR FORMULA (5EA/BX )

## (undated) DEVICE — GLOVE BIOGEL INDICATOR SZ 7SURGICAL PF LTX - (50/BX 4BX/CA)

## (undated) DEVICE — TOWEL STOP TIMEOUT SAFETY FLAG (40EA/CA)

## (undated) DEVICE — GLOVE BIOGEL SZ 7 SURGICAL PF LTX - (50PR/BX 4BX/CA)

## (undated) DEVICE — CHLORAPREP 26 ML APPLICATOR - ORANGE TINT(25/CA)

## (undated) DEVICE — SUTURE PASSER SELF CAPTURE

## (undated) DEVICE — SHAVER, 5.5 RESECTOR

## (undated) DEVICE — TUBING CASSETTE CROSSFLOW INTEGRATED (10EA/CA)

## (undated) DEVICE — SENSOR OXIMETER ADULT SPO2 RD SET (20EA/BX)

## (undated) DEVICE — SUTURE 3-0 MONOCRYL PLUS PS-1 - 27 INCH (36/BX)

## (undated) DEVICE — ELECTRODE DUAL RETURN W/ CORD - (50/PK)

## (undated) DEVICE — SUTURE 3-0 PROLENE PS-1 (12PK/BX)

## (undated) DEVICE — CANNULA FULLY THREADED 8 X 75 (5EA/BX)

## (undated) DEVICE — Device

## (undated) DEVICE — CANNULA TWIST IN 8.25MM X 7CM (5/BX)